# Patient Record
Sex: MALE | Race: WHITE | NOT HISPANIC OR LATINO | Employment: FULL TIME | URBAN - METROPOLITAN AREA
[De-identification: names, ages, dates, MRNs, and addresses within clinical notes are randomized per-mention and may not be internally consistent; named-entity substitution may affect disease eponyms.]

---

## 2021-02-20 ENCOUNTER — ANESTHESIA EVENT (OUTPATIENT)
Dept: PERIOP | Facility: HOSPITAL | Age: 66
End: 2021-02-20

## 2021-02-20 ENCOUNTER — ANESTHESIA (OUTPATIENT)
Dept: PERIOP | Facility: HOSPITAL | Age: 66
End: 2021-02-20

## 2021-02-20 ENCOUNTER — APPOINTMENT (OUTPATIENT)
Dept: GENERAL RADIOLOGY | Facility: HOSPITAL | Age: 66
End: 2021-02-20

## 2021-02-20 ENCOUNTER — HOSPITAL ENCOUNTER (OUTPATIENT)
Facility: HOSPITAL | Age: 66
Discharge: HOME | End: 2021-02-21
Attending: EMERGENCY MEDICINE | Admitting: ORTHOPAEDIC SURGERY

## 2021-02-20 DIAGNOSIS — S82.891B OPEN FRACTURE DISLOCATION OF RIGHT ANKLE: Primary | ICD-10-CM

## 2021-02-20 PROBLEM — I50.22 CHRONIC SYSTOLIC CONGESTIVE HEART FAILURE (HCC): Status: ACTIVE | Noted: 2021-02-20

## 2021-02-20 PROBLEM — I42.9 CARDIOMYOPATHY (HCC): Status: ACTIVE | Noted: 2021-02-20

## 2021-02-20 PROBLEM — I48.0 PAROXYSMAL A-FIB (HCC): Status: ACTIVE | Noted: 2021-02-20

## 2021-02-20 PROBLEM — E11.9 DM (DIABETES MELLITUS), TYPE 2 (HCC): Status: ACTIVE | Noted: 2021-02-20

## 2021-02-20 PROBLEM — N17.9 AKI (ACUTE KIDNEY INJURY) (HCC): Status: ACTIVE | Noted: 2021-02-20

## 2021-02-20 PROBLEM — I10 HTN (HYPERTENSION): Status: ACTIVE | Noted: 2021-02-20

## 2021-02-20 LAB
ABO GROUP BLD: NORMAL
ALBUMIN SERPL-MCNC: 4.5 G/DL (ref 3.5–5.2)
ALBUMIN/GLOB SERPL: 2 G/DL
ALP SERPL-CCNC: 40 U/L (ref 39–117)
ALT SERPL W P-5'-P-CCNC: 19 U/L (ref 1–41)
ANION GAP SERPL CALCULATED.3IONS-SCNC: 10.7 MMOL/L (ref 5–15)
APTT PPP: 38.7 SECONDS (ref 22.7–35.4)
AST SERPL-CCNC: 20 U/L (ref 1–40)
BASOPHILS # BLD AUTO: 0.07 10*3/MM3 (ref 0–0.2)
BASOPHILS NFR BLD AUTO: 0.9 % (ref 0–1.5)
BILIRUB SERPL-MCNC: 0.6 MG/DL (ref 0–1.2)
BLD GP AB SCN SERPL QL: NEGATIVE
BUN SERPL-MCNC: 18 MG/DL (ref 8–23)
BUN/CREAT SERPL: 13.6 (ref 7–25)
CALCIUM SPEC-SCNC: 9.5 MG/DL (ref 8.6–10.5)
CHLORIDE SERPL-SCNC: 107 MMOL/L (ref 98–107)
CO2 SERPL-SCNC: 21.3 MMOL/L (ref 22–29)
CREAT SERPL-MCNC: 1.32 MG/DL (ref 0.76–1.27)
DEPRECATED RDW RBC AUTO: 38.9 FL (ref 37–54)
EOSINOPHIL # BLD AUTO: 0.6 10*3/MM3 (ref 0–0.4)
EOSINOPHIL NFR BLD AUTO: 7.7 % (ref 0.3–6.2)
ERYTHROCYTE [DISTWIDTH] IN BLOOD BY AUTOMATED COUNT: 12.4 % (ref 12.3–15.4)
GFR SERPL CREATININE-BSD FRML MDRD: 54 ML/MIN/1.73
GLOBULIN UR ELPH-MCNC: 2.3 GM/DL
GLUCOSE BLDC GLUCOMTR-MCNC: 91 MG/DL (ref 70–130)
GLUCOSE BLDC GLUCOMTR-MCNC: 99 MG/DL (ref 70–130)
GLUCOSE SERPL-MCNC: 111 MG/DL (ref 65–99)
HBA1C MFR BLD: 6.25 % (ref 4.8–5.6)
HCT VFR BLD AUTO: 40.5 % (ref 37.5–51)
HGB BLD-MCNC: 13.5 G/DL (ref 13–17.7)
IMM GRANULOCYTES # BLD AUTO: 0.02 10*3/MM3 (ref 0–0.05)
IMM GRANULOCYTES NFR BLD AUTO: 0.3 % (ref 0–0.5)
INR PPP: 2.68 (ref 0.9–1.1)
LYMPHOCYTES # BLD AUTO: 1.36 10*3/MM3 (ref 0.7–3.1)
LYMPHOCYTES NFR BLD AUTO: 17.5 % (ref 19.6–45.3)
MCH RBC QN AUTO: 28.8 PG (ref 26.6–33)
MCHC RBC AUTO-ENTMCNC: 33.3 G/DL (ref 31.5–35.7)
MCV RBC AUTO: 86.5 FL (ref 79–97)
MONOCYTES # BLD AUTO: 0.56 10*3/MM3 (ref 0.1–0.9)
MONOCYTES NFR BLD AUTO: 7.2 % (ref 5–12)
NEUTROPHILS NFR BLD AUTO: 5.17 10*3/MM3 (ref 1.7–7)
NEUTROPHILS NFR BLD AUTO: 66.4 % (ref 42.7–76)
NRBC BLD AUTO-RTO: 0 /100 WBC (ref 0–0.2)
PLATELET # BLD AUTO: 186 10*3/MM3 (ref 140–450)
PMV BLD AUTO: 12.3 FL (ref 6–12)
POTASSIUM SERPL-SCNC: 4.7 MMOL/L (ref 3.5–5.2)
PROT SERPL-MCNC: 6.8 G/DL (ref 6–8.5)
PROTHROMBIN TIME: 28.3 SECONDS (ref 11.7–14.2)
QT INTERVAL: 472 MS
RBC # BLD AUTO: 4.68 10*6/MM3 (ref 4.14–5.8)
RH BLD: POSITIVE
SARS-COV-2 RNA RESP QL NAA+PROBE: NOT DETECTED
SODIUM SERPL-SCNC: 139 MMOL/L (ref 136–145)
T&S EXPIRATION DATE: NORMAL
WBC # BLD AUTO: 7.78 10*3/MM3 (ref 3.4–10.8)

## 2021-02-20 PROCEDURE — 96375 TX/PRO/DX INJ NEW DRUG ADDON: CPT

## 2021-02-20 PROCEDURE — 96365 THER/PROPH/DIAG IV INF INIT: CPT

## 2021-02-20 PROCEDURE — 86901 BLOOD TYPING SEROLOGIC RH(D): CPT | Performed by: PHYSICIAN ASSISTANT

## 2021-02-20 PROCEDURE — G0378 HOSPITAL OBSERVATION PER HR: HCPCS

## 2021-02-20 PROCEDURE — 73590 X-RAY EXAM OF LOWER LEG: CPT

## 2021-02-20 PROCEDURE — 76000 FLUOROSCOPY <1 HR PHYS/QHP: CPT

## 2021-02-20 PROCEDURE — 63710000001 SACUBITRIL-VALSARTAN 24-26 MG TABLET: Performed by: NURSE PRACTITIONER

## 2021-02-20 PROCEDURE — 25010000002 ROPIVACAINE PER 1 MG: Performed by: ANESTHESIOLOGY

## 2021-02-20 PROCEDURE — A9270 NON-COVERED ITEM OR SERVICE: HCPCS | Performed by: ORTHOPAEDIC SURGERY

## 2021-02-20 PROCEDURE — 73600 X-RAY EXAM OF ANKLE: CPT

## 2021-02-20 PROCEDURE — 93010 ELECTROCARDIOGRAM REPORT: CPT | Performed by: INTERNAL MEDICINE

## 2021-02-20 PROCEDURE — 25010000002 MORPHINE PER 10 MG: Performed by: EMERGENCY MEDICINE

## 2021-02-20 PROCEDURE — 25010000002 HYDROMORPHONE PER 4 MG: Performed by: ANESTHESIOLOGY

## 2021-02-20 PROCEDURE — 73610 X-RAY EXAM OF ANKLE: CPT

## 2021-02-20 PROCEDURE — 86850 RBC ANTIBODY SCREEN: CPT | Performed by: PHYSICIAN ASSISTANT

## 2021-02-20 PROCEDURE — 25010000002 DIPHENHYDRAMINE PER 50 MG: Performed by: ANESTHESIOLOGY

## 2021-02-20 PROCEDURE — 99285 EMERGENCY DEPT VISIT HI MDM: CPT

## 2021-02-20 PROCEDURE — 80053 COMPREHEN METABOLIC PANEL: CPT | Performed by: PHYSICIAN ASSISTANT

## 2021-02-20 PROCEDURE — 85730 THROMBOPLASTIN TIME PARTIAL: CPT | Performed by: PHYSICIAN ASSISTANT

## 2021-02-20 PROCEDURE — 25010000002 ONDANSETRON PER 1 MG: Performed by: INTERNAL MEDICINE

## 2021-02-20 PROCEDURE — 63710000001 CARVEDILOL 25 MG TABLET: Performed by: NURSE PRACTITIONER

## 2021-02-20 PROCEDURE — 93005 ELECTROCARDIOGRAM TRACING: CPT | Performed by: PHYSICIAN ASSISTANT

## 2021-02-20 PROCEDURE — C1713 ANCHOR/SCREW BN/BN,TIS/BN: HCPCS | Performed by: ORTHOPAEDIC SURGERY

## 2021-02-20 PROCEDURE — 25010000003 PHYTONADIONE 10 MG/ML SOLUTION 1 ML AMPULE: Performed by: PHYSICIAN ASSISTANT

## 2021-02-20 PROCEDURE — C9803 HOPD COVID-19 SPEC COLLECT: HCPCS

## 2021-02-20 PROCEDURE — 86900 BLOOD TYPING SEROLOGIC ABO: CPT | Performed by: PHYSICIAN ASSISTANT

## 2021-02-20 PROCEDURE — 63710000001 POVIDONE-IODINE 10 % SOLUTION 15 ML BOTTLE: Performed by: ORTHOPAEDIC SURGERY

## 2021-02-20 PROCEDURE — 25010000002 ONDANSETRON PER 1 MG: Performed by: ORTHOPAEDIC SURGERY

## 2021-02-20 PROCEDURE — 25010000002 NEOSTIGMINE PER 0.5 MG: Performed by: ANESTHESIOLOGY

## 2021-02-20 PROCEDURE — 82962 GLUCOSE BLOOD TEST: CPT

## 2021-02-20 PROCEDURE — 25010000002 FENTANYL CITRATE (PF) 100 MCG/2ML SOLUTION: Performed by: ANESTHESIOLOGY

## 2021-02-20 PROCEDURE — 71045 X-RAY EXAM CHEST 1 VIEW: CPT

## 2021-02-20 PROCEDURE — 83036 HEMOGLOBIN GLYCOSYLATED A1C: CPT | Performed by: NURSE PRACTITIONER

## 2021-02-20 PROCEDURE — 85025 COMPLETE CBC W/AUTO DIFF WBC: CPT | Performed by: PHYSICIAN ASSISTANT

## 2021-02-20 PROCEDURE — 25010000002 HYDROMORPHONE 1 MG/ML SOLUTION: Performed by: EMERGENCY MEDICINE

## 2021-02-20 PROCEDURE — U0003 INFECTIOUS AGENT DETECTION BY NUCLEIC ACID (DNA OR RNA); SEVERE ACUTE RESPIRATORY SYNDROME CORONAVIRUS 2 (SARS-COV-2) (CORONAVIRUS DISEASE [COVID-19]), AMPLIFIED PROBE TECHNIQUE, MAKING USE OF HIGH THROUGHPUT TECHNOLOGIES AS DESCRIBED BY CMS-2020-01-R: HCPCS | Performed by: PHYSICIAN ASSISTANT

## 2021-02-20 PROCEDURE — 25010000002 ONDANSETRON PER 1 MG: Performed by: EMERGENCY MEDICINE

## 2021-02-20 PROCEDURE — 63710000001 OXYCODONE-ACETAMINOPHEN 7.5-325 MG TABLET: Performed by: ORTHOPAEDIC SURGERY

## 2021-02-20 PROCEDURE — A9270 NON-COVERED ITEM OR SERVICE: HCPCS | Performed by: NURSE PRACTITIONER

## 2021-02-20 PROCEDURE — 25010000003 CEFAZOLIN IN DEXTROSE 2-4 GM/100ML-% SOLUTION: Performed by: PHYSICIAN ASSISTANT

## 2021-02-20 PROCEDURE — 90715 TDAP VACCINE 7 YRS/> IM: CPT | Performed by: PHYSICIAN ASSISTANT

## 2021-02-20 PROCEDURE — 25010000003 MEPIVACAINE PER 10 ML: Performed by: ANESTHESIOLOGY

## 2021-02-20 PROCEDURE — 25010000002 PROPOFOL 10 MG/ML EMULSION: Performed by: ANESTHESIOLOGY

## 2021-02-20 PROCEDURE — 99152 MOD SED SAME PHYS/QHP 5/>YRS: CPT

## 2021-02-20 PROCEDURE — 25010000002 PROPOFOL 10 MG/ML EMULSION: Performed by: EMERGENCY MEDICINE

## 2021-02-20 PROCEDURE — 63710000001 DOCUSATE SODIUM 100 MG CAPSULE: Performed by: ORTHOPAEDIC SURGERY

## 2021-02-20 PROCEDURE — 90471 IMMUNIZATION ADMIN: CPT | Performed by: PHYSICIAN ASSISTANT

## 2021-02-20 PROCEDURE — 76942 ECHO GUIDE FOR BIOPSY: CPT | Performed by: ORTHOPAEDIC SURGERY

## 2021-02-20 PROCEDURE — 25010000002 TDAP 5-2.5-18.5 LF-MCG/0.5 SUSPENSION: Performed by: PHYSICIAN ASSISTANT

## 2021-02-20 PROCEDURE — 25010000002 CEFAZOLIN 1-4 GM/50ML-% SOLUTION: Performed by: ANESTHESIOLOGY

## 2021-02-20 PROCEDURE — 85610 PROTHROMBIN TIME: CPT | Performed by: PHYSICIAN ASSISTANT

## 2021-02-20 DEVICE — JET-X FREEDOM CLAMP 10.5MM TO 10.5MM
Type: IMPLANTABLE DEVICE | Site: LEG | Status: FUNCTIONAL
Brand: JET-X

## 2021-02-20 DEVICE — JET-X 4 HOLE PIN CLAMP
Type: IMPLANTABLE DEVICE | Site: LEG | Status: FUNCTIONAL
Brand: JET-X

## 2021-02-20 DEVICE — JET-X QUICK CLAMP 10.5MM TO 5MM
Type: IMPLANTABLE DEVICE | Site: LEG | Status: FUNCTIONAL
Brand: JET-X

## 2021-02-20 DEVICE — TIN 5MM X 35MM SHORT HALF PIN
Type: IMPLANTABLE DEVICE | Site: LEG | Status: FUNCTIONAL
Brand: JET-X

## 2021-02-20 DEVICE — JET-X V-BAR
Type: IMPLANTABLE DEVICE | Site: LEG | Status: FUNCTIONAL
Brand: JET-X

## 2021-02-20 DEVICE — SUT/ANCH JUGGERKNOT SFT RIGID SHT SZ1 1.4MM W/BIT: Type: IMPLANTABLE DEVICE | Site: ANKLE | Status: FUNCTIONAL

## 2021-02-20 DEVICE — TIN 5 X 50 TRACTION PIN
Type: IMPLANTABLE DEVICE | Site: LEG | Status: FUNCTIONAL
Brand: JET-X

## 2021-02-20 DEVICE — JET-X 30 DEGREE ANGLED POST
Type: IMPLANTABLE DEVICE | Site: LEG | Status: FUNCTIONAL
Brand: JET-X

## 2021-02-20 RX ORDER — SODIUM CHLORIDE 0.9 % (FLUSH) 0.9 %
10 SYRINGE (ML) INJECTION AS NEEDED
Status: DISCONTINUED | OUTPATIENT
Start: 2021-02-20 | End: 2021-02-21 | Stop reason: HOSPADM

## 2021-02-20 RX ORDER — LEVOTHYROXINE SODIUM 0.07 MG/1
75 TABLET ORAL
Status: DISCONTINUED | OUTPATIENT
Start: 2021-02-21 | End: 2021-02-21 | Stop reason: HOSPADM

## 2021-02-20 RX ORDER — ROCURONIUM BROMIDE 10 MG/ML
INJECTION, SOLUTION INTRAVENOUS AS NEEDED
Status: DISCONTINUED | OUTPATIENT
Start: 2021-02-20 | End: 2021-02-20 | Stop reason: SURG

## 2021-02-20 RX ORDER — CEFAZOLIN SODIUM 2 G/100ML
2 INJECTION, SOLUTION INTRAVENOUS ONCE
Status: COMPLETED | OUTPATIENT
Start: 2021-02-20 | End: 2021-02-20

## 2021-02-20 RX ORDER — NALOXONE HCL 0.4 MG/ML
0.2 VIAL (ML) INJECTION AS NEEDED
Status: DISCONTINUED | OUTPATIENT
Start: 2021-02-20 | End: 2021-02-20 | Stop reason: HOSPADM

## 2021-02-20 RX ORDER — DIPHENHYDRAMINE HCL 25 MG
25 CAPSULE ORAL
Status: DISCONTINUED | OUTPATIENT
Start: 2021-02-20 | End: 2021-02-20 | Stop reason: HOSPADM

## 2021-02-20 RX ORDER — HYDROMORPHONE HCL 110MG/55ML
PATIENT CONTROLLED ANALGESIA SYRINGE INTRAVENOUS AS NEEDED
Status: DISCONTINUED | OUTPATIENT
Start: 2021-02-20 | End: 2021-02-20 | Stop reason: SURG

## 2021-02-20 RX ORDER — FENTANYL CITRATE 50 UG/ML
50 INJECTION, SOLUTION INTRAMUSCULAR; INTRAVENOUS
Status: DISCONTINUED | OUTPATIENT
Start: 2021-02-20 | End: 2021-02-20 | Stop reason: HOSPADM

## 2021-02-20 RX ORDER — SODIUM CHLORIDE, SODIUM LACTATE, POTASSIUM CHLORIDE, CALCIUM CHLORIDE 600; 310; 30; 20 MG/100ML; MG/100ML; MG/100ML; MG/100ML
9 INJECTION, SOLUTION INTRAVENOUS CONTINUOUS PRN
Status: DISCONTINUED | OUTPATIENT
Start: 2021-02-20 | End: 2021-02-21 | Stop reason: HOSPADM

## 2021-02-20 RX ORDER — MIDAZOLAM HYDROCHLORIDE 1 MG/ML
1 INJECTION INTRAMUSCULAR; INTRAVENOUS
Status: DISCONTINUED | OUTPATIENT
Start: 2021-02-20 | End: 2021-02-20 | Stop reason: HOSPADM

## 2021-02-20 RX ORDER — OXYCODONE AND ACETAMINOPHEN 7.5; 325 MG/1; MG/1
1 TABLET ORAL ONCE AS NEEDED
Status: DISCONTINUED | OUTPATIENT
Start: 2021-02-20 | End: 2021-02-20 | Stop reason: HOSPADM

## 2021-02-20 RX ORDER — CARVEDILOL 25 MG/1
25 TABLET ORAL 2 TIMES DAILY WITH MEALS
COMMUNITY

## 2021-02-20 RX ORDER — ACETAMINOPHEN 650 MG/1
650 SUPPOSITORY RECTAL EVERY 4 HOURS PRN
Status: DISCONTINUED | OUTPATIENT
Start: 2021-02-20 | End: 2021-02-21 | Stop reason: HOSPADM

## 2021-02-20 RX ORDER — PROMETHAZINE HYDROCHLORIDE 25 MG/1
25 SUPPOSITORY RECTAL ONCE AS NEEDED
Status: DISCONTINUED | OUTPATIENT
Start: 2021-02-20 | End: 2021-02-20 | Stop reason: HOSPADM

## 2021-02-20 RX ORDER — ACETAMINOPHEN 160 MG/5ML
650 SOLUTION ORAL EVERY 4 HOURS PRN
Status: DISCONTINUED | OUTPATIENT
Start: 2021-02-20 | End: 2021-02-21 | Stop reason: HOSPADM

## 2021-02-20 RX ORDER — ASPIRIN 81 MG/1
81 TABLET ORAL DAILY
Status: DISCONTINUED | OUTPATIENT
Start: 2021-02-21 | End: 2021-02-21 | Stop reason: HOSPADM

## 2021-02-20 RX ORDER — FENTANYL CITRATE 50 UG/ML
INJECTION, SOLUTION INTRAMUSCULAR; INTRAVENOUS AS NEEDED
Status: DISCONTINUED | OUTPATIENT
Start: 2021-02-20 | End: 2021-02-20 | Stop reason: SURG

## 2021-02-20 RX ORDER — ONDANSETRON 2 MG/ML
4 INJECTION INTRAMUSCULAR; INTRAVENOUS ONCE
Status: COMPLETED | OUTPATIENT
Start: 2021-02-20 | End: 2021-02-20

## 2021-02-20 RX ORDER — LIDOCAINE HYDROCHLORIDE 20 MG/ML
INJECTION, SOLUTION INFILTRATION; PERINEURAL AS NEEDED
Status: DISCONTINUED | OUTPATIENT
Start: 2021-02-20 | End: 2021-02-20 | Stop reason: SURG

## 2021-02-20 RX ORDER — ACETAMINOPHEN 325 MG/1
650 TABLET ORAL EVERY 4 HOURS PRN
Status: DISCONTINUED | OUTPATIENT
Start: 2021-02-20 | End: 2021-02-21 | Stop reason: HOSPADM

## 2021-02-20 RX ORDER — MORPHINE SULFATE 2 MG/ML
2 INJECTION, SOLUTION INTRAMUSCULAR; INTRAVENOUS
Status: DISCONTINUED | OUTPATIENT
Start: 2021-02-20 | End: 2021-02-20 | Stop reason: SDUPTHER

## 2021-02-20 RX ORDER — LEVOTHYROXINE SODIUM 0.07 MG/1
75 TABLET ORAL DAILY
COMMUNITY

## 2021-02-20 RX ORDER — ONDANSETRON 2 MG/ML
4 INJECTION INTRAMUSCULAR; INTRAVENOUS ONCE AS NEEDED
Status: DISCONTINUED | OUTPATIENT
Start: 2021-02-20 | End: 2021-02-20 | Stop reason: HOSPADM

## 2021-02-20 RX ORDER — HYDROCODONE BITARTRATE AND ACETAMINOPHEN 5; 325 MG/1; MG/1
1 TABLET ORAL EVERY 4 HOURS PRN
Status: DISCONTINUED | OUTPATIENT
Start: 2021-02-20 | End: 2021-02-21 | Stop reason: HOSPADM

## 2021-02-20 RX ORDER — WARFARIN SODIUM 7.5 MG/1
7.5 TABLET ORAL
COMMUNITY
End: 2021-02-21 | Stop reason: HOSPADM

## 2021-02-20 RX ORDER — CARVEDILOL 25 MG/1
25 TABLET ORAL 2 TIMES DAILY WITH MEALS
Status: DISCONTINUED | OUTPATIENT
Start: 2021-02-20 | End: 2021-02-21 | Stop reason: HOSPADM

## 2021-02-20 RX ORDER — ALBUTEROL SULFATE 90 UG/1
2 AEROSOL, METERED RESPIRATORY (INHALATION) EVERY 4 HOURS PRN
COMMUNITY

## 2021-02-20 RX ORDER — EPHEDRINE SULFATE 50 MG/ML
5 INJECTION, SOLUTION INTRAVENOUS ONCE AS NEEDED
Status: DISCONTINUED | OUTPATIENT
Start: 2021-02-20 | End: 2021-02-20 | Stop reason: HOSPADM

## 2021-02-20 RX ORDER — CEFAZOLIN SODIUM 2 G/100ML
2 INJECTION, SOLUTION INTRAVENOUS EVERY 8 HOURS
Status: DISCONTINUED | OUTPATIENT
Start: 2021-02-21 | End: 2021-02-21 | Stop reason: HOSPADM

## 2021-02-20 RX ORDER — ONDANSETRON 2 MG/ML
4 INJECTION INTRAMUSCULAR; INTRAVENOUS EVERY 6 HOURS PRN
Status: DISCONTINUED | OUTPATIENT
Start: 2021-02-20 | End: 2021-02-21 | Stop reason: HOSPADM

## 2021-02-20 RX ORDER — AMIODARONE HYDROCHLORIDE 200 MG/1
100 TABLET ORAL DAILY
Status: DISCONTINUED | OUTPATIENT
Start: 2021-02-21 | End: 2021-02-21 | Stop reason: HOSPADM

## 2021-02-20 RX ORDER — DIPHENHYDRAMINE HYDROCHLORIDE 50 MG/ML
12.5 INJECTION INTRAMUSCULAR; INTRAVENOUS
Status: DISCONTINUED | OUTPATIENT
Start: 2021-02-20 | End: 2021-02-20 | Stop reason: HOSPADM

## 2021-02-20 RX ORDER — EPHEDRINE SULFATE 50 MG/ML
INJECTION, SOLUTION INTRAVENOUS AS NEEDED
Status: DISCONTINUED | OUTPATIENT
Start: 2021-02-20 | End: 2021-02-20 | Stop reason: SURG

## 2021-02-20 RX ORDER — NALOXONE HCL 0.4 MG/ML
0.4 VIAL (ML) INJECTION
Status: DISCONTINUED | OUTPATIENT
Start: 2021-02-20 | End: 2021-02-21 | Stop reason: HOSPADM

## 2021-02-20 RX ORDER — PROPOFOL 10 MG/ML
VIAL (ML) INTRAVENOUS AS NEEDED
Status: DISCONTINUED | OUTPATIENT
Start: 2021-02-20 | End: 2021-02-20 | Stop reason: SURG

## 2021-02-20 RX ORDER — SODIUM CHLORIDE 0.9 % (FLUSH) 0.9 %
10 SYRINGE (ML) INJECTION EVERY 12 HOURS SCHEDULED
Status: DISCONTINUED | OUTPATIENT
Start: 2021-02-20 | End: 2021-02-21 | Stop reason: HOSPADM

## 2021-02-20 RX ORDER — SODIUM CHLORIDE 0.9 % (FLUSH) 0.9 %
10 SYRINGE (ML) INJECTION EVERY 12 HOURS SCHEDULED
Status: DISCONTINUED | OUTPATIENT
Start: 2021-02-20 | End: 2021-02-20 | Stop reason: HOSPADM

## 2021-02-20 RX ORDER — MORPHINE SULFATE 2 MG/ML
4 INJECTION, SOLUTION INTRAMUSCULAR; INTRAVENOUS ONCE
Status: COMPLETED | OUTPATIENT
Start: 2021-02-20 | End: 2021-02-20

## 2021-02-20 RX ORDER — OXYCODONE AND ACETAMINOPHEN 7.5; 325 MG/1; MG/1
2 TABLET ORAL EVERY 4 HOURS PRN
Status: DISCONTINUED | OUTPATIENT
Start: 2021-02-20 | End: 2021-02-21 | Stop reason: HOSPADM

## 2021-02-20 RX ORDER — MORPHINE SULFATE 2 MG/ML
2 INJECTION, SOLUTION INTRAMUSCULAR; INTRAVENOUS EVERY 4 HOURS PRN
Status: DISCONTINUED | OUTPATIENT
Start: 2021-02-20 | End: 2021-02-21 | Stop reason: HOSPADM

## 2021-02-20 RX ORDER — PROPOFOL 10 MG/ML
VIAL (ML) INTRAVENOUS
Status: COMPLETED
Start: 2021-02-20 | End: 2021-02-20

## 2021-02-20 RX ORDER — DEXTROSE MONOHYDRATE 25 G/50ML
25 INJECTION, SOLUTION INTRAVENOUS
Status: DISCONTINUED | OUTPATIENT
Start: 2021-02-20 | End: 2021-02-21 | Stop reason: HOSPADM

## 2021-02-20 RX ORDER — HYDROCODONE BITARTRATE AND ACETAMINOPHEN 7.5; 325 MG/1; MG/1
1 TABLET ORAL ONCE AS NEEDED
Status: DISCONTINUED | OUTPATIENT
Start: 2021-02-20 | End: 2021-02-20 | Stop reason: HOSPADM

## 2021-02-20 RX ORDER — SODIUM CHLORIDE 0.9 % (FLUSH) 0.9 %
10 SYRINGE (ML) INJECTION AS NEEDED
Status: DISCONTINUED | OUTPATIENT
Start: 2021-02-20 | End: 2021-02-20 | Stop reason: HOSPADM

## 2021-02-20 RX ORDER — PROPOFOL 10 MG/ML
VIAL (ML) INTRAVENOUS
Status: COMPLETED | OUTPATIENT
Start: 2021-02-20 | End: 2021-02-20

## 2021-02-20 RX ORDER — MIDAZOLAM HYDROCHLORIDE 1 MG/ML
0.5 INJECTION INTRAMUSCULAR; INTRAVENOUS
Status: DISCONTINUED | OUTPATIENT
Start: 2021-02-20 | End: 2021-02-20 | Stop reason: HOSPADM

## 2021-02-20 RX ORDER — DOCUSATE SODIUM 100 MG/1
100 CAPSULE, LIQUID FILLED ORAL 2 TIMES DAILY
Status: DISCONTINUED | OUTPATIENT
Start: 2021-02-20 | End: 2021-02-21 | Stop reason: HOSPADM

## 2021-02-20 RX ORDER — ASPIRIN 81 MG/1
81 TABLET ORAL DAILY
COMMUNITY

## 2021-02-20 RX ORDER — AMIODARONE HYDROCHLORIDE 200 MG/1
100 TABLET ORAL DAILY
COMMUNITY

## 2021-02-20 RX ORDER — INSULIN LISPRO 100 [IU]/ML
0-9 INJECTION, SOLUTION INTRAVENOUS; SUBCUTANEOUS
Status: DISCONTINUED | OUTPATIENT
Start: 2021-02-21 | End: 2021-02-21 | Stop reason: HOSPADM

## 2021-02-20 RX ORDER — OXYCODONE AND ACETAMINOPHEN 7.5; 325 MG/1; MG/1
1 TABLET ORAL EVERY 4 HOURS PRN
Status: DISCONTINUED | OUTPATIENT
Start: 2021-02-20 | End: 2021-02-21 | Stop reason: HOSPADM

## 2021-02-20 RX ORDER — SPIRONOLACTONE 25 MG/1
25 TABLET ORAL DAILY
COMMUNITY

## 2021-02-20 RX ORDER — HYDROMORPHONE HYDROCHLORIDE 1 MG/ML
0.5 INJECTION, SOLUTION INTRAMUSCULAR; INTRAVENOUS; SUBCUTANEOUS
Status: DISCONTINUED | OUTPATIENT
Start: 2021-02-20 | End: 2021-02-20 | Stop reason: HOSPADM

## 2021-02-20 RX ORDER — PROMETHAZINE HYDROCHLORIDE 25 MG/1
25 TABLET ORAL ONCE AS NEEDED
Status: DISCONTINUED | OUTPATIENT
Start: 2021-02-20 | End: 2021-02-20 | Stop reason: HOSPADM

## 2021-02-20 RX ORDER — SACUBITRIL AND VALSARTAN 24; 26 MG/1; MG/1
1 TABLET, FILM COATED ORAL 2 TIMES DAILY
COMMUNITY

## 2021-02-20 RX ORDER — CEFAZOLIN SODIUM 1 G/3ML
1 INJECTION, POWDER, FOR SOLUTION INTRAMUSCULAR; INTRAVENOUS ONCE
Status: DISCONTINUED | OUTPATIENT
Start: 2021-02-20 | End: 2021-02-20 | Stop reason: HOSPADM

## 2021-02-20 RX ORDER — NICOTINE POLACRILEX 4 MG
15 LOZENGE BUCCAL
Status: DISCONTINUED | OUTPATIENT
Start: 2021-02-20 | End: 2021-02-21 | Stop reason: HOSPADM

## 2021-02-20 RX ORDER — MAGNESIUM HYDROXIDE 1200 MG/15ML
LIQUID ORAL AS NEEDED
Status: DISCONTINUED | OUTPATIENT
Start: 2021-02-20 | End: 2021-02-20 | Stop reason: HOSPADM

## 2021-02-20 RX ORDER — CEFAZOLIN SODIUM 1 G/50ML
INJECTION, SOLUTION INTRAVENOUS AS NEEDED
Status: DISCONTINUED | OUTPATIENT
Start: 2021-02-20 | End: 2021-02-20 | Stop reason: SURG

## 2021-02-20 RX ORDER — ROPIVACAINE HYDROCHLORIDE 5 MG/ML
INJECTION, SOLUTION EPIDURAL; INFILTRATION; PERINEURAL
Status: DISCONTINUED | OUTPATIENT
Start: 2021-02-20 | End: 2021-02-20 | Stop reason: SURG

## 2021-02-20 RX ORDER — FLUMAZENIL 0.1 MG/ML
0.2 INJECTION INTRAVENOUS AS NEEDED
Status: DISCONTINUED | OUTPATIENT
Start: 2021-02-20 | End: 2021-02-20 | Stop reason: HOSPADM

## 2021-02-20 RX ORDER — GLYCOPYRROLATE 0.2 MG/ML
INJECTION INTRAMUSCULAR; INTRAVENOUS AS NEEDED
Status: DISCONTINUED | OUTPATIENT
Start: 2021-02-20 | End: 2021-02-20 | Stop reason: SURG

## 2021-02-20 RX ORDER — FAMOTIDINE 10 MG/ML
20 INJECTION, SOLUTION INTRAVENOUS
Status: COMPLETED | OUTPATIENT
Start: 2021-02-20 | End: 2021-02-20

## 2021-02-20 RX ORDER — LABETALOL HYDROCHLORIDE 5 MG/ML
5 INJECTION, SOLUTION INTRAVENOUS
Status: DISCONTINUED | OUTPATIENT
Start: 2021-02-20 | End: 2021-02-20 | Stop reason: HOSPADM

## 2021-02-20 RX ADMIN — HYDROMORPHONE HYDROCHLORIDE 1 MG: 1 INJECTION, SOLUTION INTRAMUSCULAR; INTRAVENOUS; SUBCUTANEOUS at 12:18

## 2021-02-20 RX ADMIN — HYDROMORPHONE HYDROCHLORIDE 0.5 MG: 1 INJECTION, SOLUTION INTRAMUSCULAR; INTRAVENOUS; SUBCUTANEOUS at 19:14

## 2021-02-20 RX ADMIN — EPHEDRINE SULFATE 10 MG: 50 INJECTION INTRAVENOUS at 18:23

## 2021-02-20 RX ADMIN — HYDROMORPHONE HYDROCHLORIDE 0.5 MG: 2 INJECTION, SOLUTION INTRAMUSCULAR; INTRAVENOUS; SUBCUTANEOUS at 18:23

## 2021-02-20 RX ADMIN — EPHEDRINE SULFATE 10 MG: 50 INJECTION INTRAVENOUS at 17:56

## 2021-02-20 RX ADMIN — PROPOFOL 200 MG: 10 INJECTION, EMULSION INTRAVENOUS at 16:29

## 2021-02-20 RX ADMIN — CEFAZOLIN SODIUM 1 G: 1 INJECTION, SOLUTION INTRAVENOUS at 16:35

## 2021-02-20 RX ADMIN — TETANUS TOXOID, REDUCED DIPHTHERIA TOXOID AND ACELLULAR PERTUSSIS VACCINE, ADSORBED 0.5 ML: 5; 2.5; 8; 8; 2.5 SUSPENSION INTRAMUSCULAR at 11:23

## 2021-02-20 RX ADMIN — CARVEDILOL 25 MG: 25 TABLET, FILM COATED ORAL at 23:06

## 2021-02-20 RX ADMIN — MEPIVACAINE HYDROCHLORIDE 10 ML: 15 INJECTION, SOLUTION EPIDURAL; INFILTRATION at 19:49

## 2021-02-20 RX ADMIN — SACUBITRIL AND VALSARTAN 1 TABLET: 24; 26 TABLET, FILM COATED ORAL at 23:06

## 2021-02-20 RX ADMIN — DIPHENHYDRAMINE HYDROCHLORIDE 12.5 MG: 50 INJECTION, SOLUTION INTRAMUSCULAR; INTRAVENOUS at 19:15

## 2021-02-20 RX ADMIN — FENTANYL CITRATE 50 MCG: 50 INJECTION, SOLUTION INTRAMUSCULAR; INTRAVENOUS at 18:45

## 2021-02-20 RX ADMIN — HYDROMORPHONE HYDROCHLORIDE 0.5 MG: 1 INJECTION, SOLUTION INTRAMUSCULAR; INTRAVENOUS; SUBCUTANEOUS at 18:48

## 2021-02-20 RX ADMIN — FENTANYL CITRATE 50 MCG: 50 INJECTION, SOLUTION INTRAMUSCULAR; INTRAVENOUS at 18:50

## 2021-02-20 RX ADMIN — GLYCOPYRROLATE 1 MG: 0.2 INJECTION INTRAMUSCULAR; INTRAVENOUS at 18:16

## 2021-02-20 RX ADMIN — LIDOCAINE HYDROCHLORIDE 100 MG: 20 INJECTION, SOLUTION INFILTRATION; PERINEURAL at 16:29

## 2021-02-20 RX ADMIN — HYDROMORPHONE HYDROCHLORIDE 0.5 MG: 1 INJECTION, SOLUTION INTRAMUSCULAR; INTRAVENOUS; SUBCUTANEOUS at 19:05

## 2021-02-20 RX ADMIN — PHYTONADIONE 10 MG: 10 INJECTION, EMULSION INTRAMUSCULAR; INTRAVENOUS; SUBCUTANEOUS at 12:57

## 2021-02-20 RX ADMIN — PROPOFOL 80 MG: 10 INJECTION, EMULSION INTRAVENOUS at 13:27

## 2021-02-20 RX ADMIN — HYDROMORPHONE HYDROCHLORIDE 0.5 MG: 2 INJECTION, SOLUTION INTRAMUSCULAR; INTRAVENOUS; SUBCUTANEOUS at 18:28

## 2021-02-20 RX ADMIN — HYDROMORPHONE HYDROCHLORIDE 0.5 MG: 1 INJECTION, SOLUTION INTRAMUSCULAR; INTRAVENOUS; SUBCUTANEOUS at 18:53

## 2021-02-20 RX ADMIN — OXYCODONE HYDROCHLORIDE AND ACETAMINOPHEN 1 TABLET: 7.5; 325 TABLET ORAL at 23:06

## 2021-02-20 RX ADMIN — FAMOTIDINE 20 MG: 10 INJECTION INTRAVENOUS at 16:15

## 2021-02-20 RX ADMIN — EPHEDRINE SULFATE 20 MG: 50 INJECTION INTRAVENOUS at 16:51

## 2021-02-20 RX ADMIN — FENTANYL CITRATE 100 MCG: 50 INJECTION INTRAMUSCULAR; INTRAVENOUS at 16:29

## 2021-02-20 RX ADMIN — CEFAZOLIN SODIUM 2 G: 2 INJECTION, SOLUTION INTRAVENOUS at 11:22

## 2021-02-20 RX ADMIN — FENTANYL CITRATE 50 MCG: 50 INJECTION, SOLUTION INTRAMUSCULAR; INTRAVENOUS at 19:10

## 2021-02-20 RX ADMIN — FENTANYL CITRATE 50 MCG: 50 INJECTION, SOLUTION INTRAMUSCULAR; INTRAVENOUS at 19:03

## 2021-02-20 RX ADMIN — SODIUM CHLORIDE, PRESERVATIVE FREE 10 ML: 5 INJECTION INTRAVENOUS at 23:08

## 2021-02-20 RX ADMIN — ROCURONIUM BROMIDE 50 MG: 10 INJECTION INTRAVENOUS at 16:29

## 2021-02-20 RX ADMIN — EPHEDRINE SULFATE 10 MG: 50 INJECTION INTRAVENOUS at 17:26

## 2021-02-20 RX ADMIN — ROPIVACAINE HYDROCHLORIDE 30 ML: 5 INJECTION, SOLUTION EPIDURAL; INFILTRATION; PERINEURAL at 19:49

## 2021-02-20 RX ADMIN — NEOSTIGMINE METHYLSULFATE 5 MG: 1 INJECTION INTRAMUSCULAR; INTRAVENOUS; SUBCUTANEOUS at 18:16

## 2021-02-20 RX ADMIN — ONDANSETRON 4 MG: 2 INJECTION INTRAMUSCULAR; INTRAVENOUS at 10:38

## 2021-02-20 RX ADMIN — SODIUM CHLORIDE, POTASSIUM CHLORIDE, SODIUM LACTATE AND CALCIUM CHLORIDE 9 ML/HR: 600; 310; 30; 20 INJECTION, SOLUTION INTRAVENOUS at 16:16

## 2021-02-20 RX ADMIN — ONDANSETRON HYDROCHLORIDE 4 MG: 2 SOLUTION INTRAMUSCULAR; INTRAVENOUS at 18:16

## 2021-02-20 RX ADMIN — DOCUSATE SODIUM 100 MG: 100 CAPSULE, LIQUID FILLED ORAL at 23:06

## 2021-02-20 RX ADMIN — MORPHINE SULFATE 4 MG: 2 INJECTION, SOLUTION INTRAMUSCULAR; INTRAVENOUS at 10:38

## 2021-02-20 NOTE — H&P
[facility] Admission H&P    Patient Care Team:  Provider, No Known as PCP - General    Chief complaint: Slipped on ice, twisted right ankle    History of Present Illness    This is a 65-year-old male with history of chronic systolic heart failure and cardiomyopathy with ejection fraction of 25%, ICD placement in 2011, paroxysmal atrial fibrillation for which she is on Coumadin, hypertension, diabetes who presented to the emergency room after he sustained a slip on some ice this morning and experienced significant twisting and torque of his right ankle resulting in dislocation and fracture.  The skin surface was disrupted on the inner aspect of the ankle.  Orthopedic surgery has been consulted and will perform surgery this evening.  I been asked to admit him for further care given his other medical issues.  He states that his congestive heart failure has been very stable and well maintained over the past several years at least.  He denies any chest pain, shortness of breath, palpitations, orthopnea.  No new onset swelling recently or change in exercise tolerance.  His medication doses have been unchanged for quite some time.    Past Medical History:   Diagnosis Date   • Depression    • Hypertension      Past Surgical History:   Procedure Laterality Date   • PACEMAKER IMPLANTATION       History reviewed. No pertinent family history.  Social History     Tobacco Use   • Smoking status: Former Smoker   Substance Use Topics   • Alcohol use: Never     Frequency: Never   • Drug use: Never     Medications reviewed    Allergies:  Statins    Review of Systems   Constitutional: Negative for chills and fever.   HENT: Negative for congestion and sore throat.    Eyes: Negative for visual disturbance.   Respiratory: Negative for cough, chest tightness, shortness of breath and wheezing.    Cardiovascular: Negative for chest pain, palpitations and leg swelling.   Gastrointestinal: Negative for abdominal distention, abdominal pain, diarrhea,  nausea and vomiting.   Endocrine: Negative for polydipsia and polyuria.   Genitourinary: Negative for difficulty urinating, dysuria, frequency and urgency.   Musculoskeletal: Negative for arthralgias and myalgias.        Pain in the right ankle   Skin: Negative for color change and rash.   Neurological: Negative for dizziness and light-headedness.        PHYSICAL EXAM    Vital Signs  tMax 24 hrs:  Temp (24hrs), Av.8 °F (36 °C), Min:96.8 °F (36 °C), Max:96.8 °F (36 °C)    Vitals Ranges:  Temp:  [96.8 °F (36 °C)] 96.8 °F (36 °C)  Heart Rate:  [69-88] 69  Resp:  [14-16] 16  BP: (116-134)/(70-92) 134/92    Physical Exam  Vitals signs and nursing note reviewed.   Constitutional:       General: He is not in acute distress.     Appearance: He is well-developed. He is not ill-appearing.   HENT:      Head: Normocephalic and atraumatic.      Nose: Nose normal.      Mouth/Throat:      Mouth: Mucous membranes are not dry.   Eyes:      Conjunctiva/sclera: Conjunctivae normal.      Pupils: Pupils are equal, round, and reactive to light.   Neck:      Musculoskeletal: Normal range of motion and neck supple.      Vascular: No JVD.   Cardiovascular:      Rate and Rhythm: Normal rate and regular rhythm.      Heart sounds: No murmur. No gallop.    Pulmonary:      Effort: Pulmonary effort is normal. No accessory muscle usage or respiratory distress.      Breath sounds: No decreased breath sounds or wheezing.   Abdominal:      General: Bowel sounds are normal. There is no distension.      Palpations: Abdomen is soft.      Tenderness: There is no abdominal tenderness. There is no guarding or rebound.   Musculoskeletal: Normal range of motion.         General: No deformity.      Comments: Right ankle is obviously deformed with dislocation and fracture.   Lymphadenopathy:      Cervical: No cervical adenopathy.   Skin:     General: Skin is warm and dry.      Findings: No erythema or rash.      Comments: Small puncture through the medial  aspect of the right ankle with trace amount of bleeding   Neurological:      Mental Status: He is alert and oriented to person, place, and time.      Cranial Nerves: No cranial nerve deficit.         Results Review:  Results from last 7 days   Lab Units 02/20/21  1033   WBC 10*3/mm3 7.78   HEMOGLOBIN g/dL 13.5   HEMATOCRIT % 40.5   PLATELETS 10*3/mm3 186     Results from last 7 days   Lab Units 02/20/21  1033   SODIUM mmol/L 139   POTASSIUM mmol/L 4.7   CHLORIDE mmol/L 107   CO2 mmol/L 21.3*   BUN mg/dL 18   CREATININE mg/dL 1.32*   CALCIUM mg/dL 9.5   BILIRUBIN mg/dL 0.6   ALK PHOS U/L 40   ALT (SGPT) U/L 19   AST (SGOT) U/L 20   GLUCOSE mg/dL 111*     X-ray of the right tib-fib and ankle:  There is a severe fracture dislocation with fracture of the   distal fibula near the lateral malleolus combined with widening of the   ankle mortise medially and anterior dislocation of the tibia relative to   the talus as seen in the lateral view.      I reviewed the patient's new clinical results.  I reviewed the patient's new imaging results and agree with the interpretation.        Active Hospital Problems    Diagnosis  POA   • **Open fracture dislocation of right ankle [S82.891B]  Unknown   • HTN (hypertension) [I10]  Unknown   • Paroxysmal A-fib (CMS/HCC) [I48.0]  Unknown   • Chronic systolic congestive heart failure (CMS/HCC) [I50.22]  Unknown   • Cardiomyopathy (CMS/HCC) [I42.9]  Unknown   • MARSHALL vs CKD 3a [N17.9]  Unknown   • DM (diabetes mellitus), type 2 (CMS/HCC) [E11.9]  Unknown      Resolved Hospital Problems   No resolved problems to display.       Assessment & Plan    The patient will be admitted.  Orthopedic surgery has been consulted and indicated they can perform surgery this evening.  From a preoperative standpoint with regards to his chronic medical conditions, he is euvolemic with no evidence of decompensated heart failure.  Heart rate is well controlled.  He is going to receive vitamin K for reversal of his  INR which I am okay with.  Defer any need for FFP to orthopedic surgery.  He is of acceptable risk to undergo surgery later today as long as his EKG does not show any acute or unexpected changes.  He denies chest pain, shortness of breath or change in exercise tolerance.  I will also check a baseline chest x-ray but his lungs sound clear and he is not on oxygen.  Will provide supportive care with pain control.  He has been given preoperative antibiotics and the emergency room provider is going to splint his leg until he can have surgery later.  Blood sugars look reasonably controlled and I think this can be controlled with diet.  No need for Accu-Cheks or sliding scale at this time.  I am not sure if his creatinine of 1.3 and slight reduction of GFR is at his baseline.  I do suspect he probably has some underlying chronic kidney disease at this point.  Last labs I can see are from 2011 as he is from [place] and receives all of his care up there..  I will review his medications once reconciled and we will follow his renal function closely along with volume status.  Additional plans based on his clinical course.    I discussed the patients findings and my recommendations with patient    I wore full protective equipment throughout the patient encounter including eye protection and facemask.  Hand hygiene was performed before donning protective equipment and after removal when leaving the room.    Fransisco Matt MD  02/20/21  12:20 EST

## 2021-02-20 NOTE — ANESTHESIA PROCEDURE NOTES
Airway  Urgency: elective    Date/Time: 2/20/2021 4:33 PM  Airway not difficult    General Information and Staff    Patient location during procedure: OR  Anesthesiologist: Shan Kay MD    Indications and Patient Condition  Indications for airway management: airway protection    Preoxygenated: yes  MILS maintained throughout  Mask difficulty assessment: 1 - vent by mask    Final Airway Details  Final airway type: endotracheal airway      Successful airway: ETT  Cuffed: yes   Successful intubation technique: direct laryngoscopy  Facilitating devices/methods: cricoid pressure  Endotracheal tube insertion site: oral  Blade: Luba  Blade size: 3  ETT size (mm): 8.0  Cormack-Lehane Classification: grade IIa - partial view of glottis  Placement verified by: chest auscultation and capnometry   Measured from: lips  ETT/EBT  to lips (cm): 23  Number of attempts at approach: 1  Assessment: lips, teeth, and gum same as pre-op and atraumatic intubation

## 2021-02-20 NOTE — ED PROVIDER NOTES
MD ATTESTATION NOTE    The ALOK and I have discussed this patient's history, physical exam, and treatment plan.  I have reviewed the documentation and personally had a face to face interaction with the patient. I affirm the documentation and agree with the treatment and plan.  The attached note describes my personal findings.      History  65-year-old male presents after fall with right ankle injury.  Pain occurred after a slip on ice while at work.    Physical Exam  Vital Signs reviewed  Alert, Well Appearing in NAD  Right ankle with obvious deformity and skin laceration noted.    EKG          EKG time: 1221  Rhythm/Rate: Atrial paced rhythm with a rate of 70  P waves and DE: Atrial paced P waves  QRS, axis: Left axis deviation, IVCD ventricular paced complexes are noted  ST and T waves: Nonspecific ST and T wave changes    Interpreted Contemporaneously by me, independently viewed  No prior to compare    Procedural Sedation    Date/Time: 2021 1:36 PM  Performed by: Emanuel Wilhelm MD  Authorized by: Fransisco Matt MD     Indications:     Procedure performed:  Fracture reduction    Procedure necessitating sedation performed by: CEM Montague.    Intended level of sedation:  Moderate (conscious sedation)  Pre-sedation assessment:     NPO status caution: urgency dictates proceeding with non-ideal NPO status      ASA classification: class 2 - patient with mild systemic disease      Neck mobility: normal      Mouth openin finger widths    Thyromental distance:  3 finger widths    Mallampati score:  II - soft palate, uvula, fauces visible    Pre-sedation assessments completed and reviewed: airway patency, anesthesia/sedation history, cardiovascular function, hydration status, mental status, nausea/vomiting, pain level, respiratory function and temperature      History of difficult intubation: no      Pre-sedation assessment completed:  2021 1:16 PM  Procedure details (see MAR for exact dosages):      Sedation start time:  2/20/2021 1:25 PM    Preoxygenation:  Nasal cannula    Sedation:  Propofol    Analgesia:  Hydromorphone    Intra-procedure monitoring:  Continuous capnometry, continuous pulse oximetry, frequent vital sign checks, cardiac monitor and blood pressure monitoring    Intra-procedure events: none      Sedation end time:  2/20/2021 1:37 PM    Total sedation time (minutes):  12  Post-procedure details:     Post-sedation assessment completed:  2/20/2021 1:37 PM    Attendance: Constant attendance by certified staff until patient recovered      Recovery: Patient returned to pre-procedure baseline      Patient is stable for discharge or admission: yes      Patient tolerance:  Tolerated well, no immediate complications            Disposition  I discussed evaluation and treatment of this patient with CEM Montague.  See above in procedure note for my assistance with fracture reduction.  Patient has been given IV antibiotics and will ultimately be admitted for operative repair of open ankle fracture.     Emanuel Wilhelm MD  02/20/21 2426

## 2021-02-20 NOTE — CONSULTS
[facility]   Consult Note    Patient Name: Joon Granados  : 1955  MRN: [id number]  Primary Care Physician: Provider, No Known  Referring Physician: Fransisco Schuler*  Date of admission: 2021    Inpatient Orthopedic Surgery Consult  Consult performed by: Sami Cortés Jr., MD  Consult ordered by: Fransisco Matt MD        Subjective   Subjective     Reason for Consult/ Chief Complaint: Right ankle pain    History of Present Illness     The patient is a very pleasant 65-year-old male [employer] patient with history of chronic heart failure, ICD placement, atrial fibrillation on Coumadin, hypertension, diabetes who was brought to the emergency department this morning after suffering a mechanical fall on ice.  He had immediate ankle pain and swelling and reports a wound over the medial ankle.  He was brought to the emergency department where radiographs showed a displaced ankle fracture dislocation.  This was confirmed to be an open ankle fracture via the medial wound as described by the emergency department staff.    He is on Coumadin.  INR in the ER was 2.7.    He localizes pain in the right ankle.  Pain can be a 7 out of 10.  It is sharp and aching.  It is worse with activity and better with rest.    While in the emergency department, his tetanus was brought up-to-date and he was given 2 g of Ancef.  The emergency department staff irrigated the open wound and placed the patient in a splint.    Review of Systems   Review of Systems:  Constitutional: Negative  HENT: Negative  Eyes: Negative  Respiratory: Negative; no shortness of breath  Cardiovascular: Negative; no current chest pain  Gastrointestinal: Negative  : Negative  Skin: Negative except as listed in HPI  Neurological: Negative, no numbness or deficits  Hematological: Negative  Muscoloskeletal: Per HPI               '      Personal History     Past Medical History:   Diagnosis Date   • Asthma    • Depression    •  Diabetes mellitus (CMS/HCC)    • Hypertension        Past Surgical History:   Procedure Laterality Date   • PACEMAKER IMPLANTATION     • TONSILLECTOMY         Family History: family history is not on file. Otherwise pertinent FHx was reviewed and not pertinent to current issue.    Social History:  reports that he has quit smoking. He does not have any smokeless tobacco history on file. He reports that he does not drink alcohol or use drugs.    Home Medications:  albuterol sulfate HFA, amiodarone, aspirin, carvedilol, levothyroxine, metFORMIN, sacubitril-valsartan, spironolactone, and warfarin      Allergies:  Allergies   Allergen Reactions   • Statins Hives       Objective    Objective   Vitals:  Temp:  [96.8 °F (36 °C)-97 °F (36.1 °C)] 97 °F (36.1 °C)  Heart Rate:  [65-88] 69  Resp:  [12-22] 18  BP: (111-134)/(70-92) 122/79  Flow (L/min):  [2] 2    Physical Exam   Physical Exam:  Vital signs reviewed.  Constitutional:  Appears well-developed, well nourished.  HEENT:  Head: normocephalic, atraumatic  Eyes: EOMI, grossly normal.  No scleral icterus.  Neck: Normal range of motion.  Supple, no tracheal deviation.  Cardiovascular: Regular rate.  Pulmonary: Effort normal, symmetric chest expansion, no labored breathing.  Abdominal: Soft, non distended  Neurological: No gross deficits, oriented to person, place, and time.  Skin: Warm and dry  Psychiatric: Normal mood and affect  Musculoskeletal:    Examination of his right lower extremity shows a well-padded short leg plaster splint in place.  Toes are warm and well-perfused with brisk capillary refill.  Sensation intact light touch over the toes.  No pain with passive stretch.  5 out of 5 EHL/FHL.        Result Review    Radiographs of the right ankle are independently reviewed.  These show a displaced fibular fracture with associated deltoid disruption/dislocation of the ankle.  Soft tissue gas noted consistent with open injury.    Assessment/Plan   Assessment / Plan      Brief Patient Summary:  Joon Granados is a 65 y.o. male with multiple medical comorbidities including atrial fibrillation on Coumadin with current INR 2.7, chronic systolic heart failure, diabetes presenting with an open displaced right ankle fracture dislocation    Active Hospital Problems:  Active Hospital Problems    Diagnosis   • **Open fracture dislocation of right ankle   • HTN (hypertension)   • Paroxysmal A-fib (CMS/HCC)   • Chronic systolic congestive heart failure (CMS/HCC)   • Cardiomyopathy (CMS/HCC)   • MARSHALL vs CKD 3a   • DM (diabetes mellitus), type 2 (CMS/HCC)       Plan:     I had a long and nell discussion with the patient about the severity of this injury.    Despite his multiple medical comorbidities, this does require urgent surgical intervention to include irrigation and debridement of the open fracture.  As his INR is currently 2.7, I have recommended staged management with close reduction with placement of an external fixator today rather than immediate open reduction internal fixation.  I think he has an unacceptably high wound healing problems/infection risk otherwise.    I discussed with him he would need to return for definitive surgery in 7 to 10 days to remove the external fixator and definitively fix his fracture.      The risks/benefits of surgery, including pain, infection, wound healing problems, need for future procedures, mal/nonunion, DVT/PE, cardiac event, and/or death were discussed, and the patient elected to proceed with surgery.      -Continue IV Ancef for open fracture  -Hold Coumadin for now; TEDs/SCD  -Pain control  -Disposition: Pending    Thank you for this consultation, please call with questions    Electronically signed by Sami Cortés Jr, MD, 02/20/21, 3:56 PM EST.

## 2021-02-20 NOTE — ED PROVIDER NOTES
EMERGENCY DEPARTMENT ENCOUNTER    Room Number:  04/04  Date seen:  2/20/2021  Time seen: 10:34 EST  PCP: No primary care provider on file.  Historian: patient      HPI:  Chief Complaint: right ankle pain    A complete HPI/ROS/PMH/PSH/SH/FH are unobtainable due to: none    Context: Joon Granados is a 65 y.o. male who presents to the ED for evaluation of right ankle pain that began just prior to arrival and is moderate to severe worse with any movement or range of motion and nothing makes it feel better.  He works for a company called "[employer]  He states that he was loading a canister into his truck while working today and slipped on the ice twisting his ankle.  He has severe pain in the area, EMS was called and he presents for further evaluation.  He does not know when his last Tdap was.  He is on warfarin for atrial fibrillation, last dose was last night.  He last ate around 5 AM today.        PAST MEDICAL HISTORY  Active Ambulatory Problems     Diagnosis Date Noted   • No Active Ambulatory Problems     Resolved Ambulatory Problems     Diagnosis Date Noted   • No Resolved Ambulatory Problems     Past Medical History:   Diagnosis Date   • Depression    • Hypertension          PAST SURGICAL HISTORY  Past Surgical History:   Procedure Laterality Date   • PACEMAKER IMPLANTATION           FAMILY HISTORY  History reviewed. No pertinent family history.      SOCIAL HISTORY  Social History     Tobacco Use   • Smoking status: Former Smoker   Substance and Sexual Activity   • Alcohol use: Never     Frequency: Never   • Drug use: Never         ALLERGIES  Statins        REVIEW OF SYSTEMS  Review of Systems     All systems reviewed and negative except for those discussed in HPI.       PHYSICAL EXAM  ED Triage Vitals   Temp Heart Rate Resp BP SpO2   02/20/21 1001 02/20/21 0958 02/20/21 0958 02/20/21 0958 02/20/21 0958   96.8 °F (36 °C) 88 14 116/70 98 %      Temp src Heart Rate Source Patient Position BP Location FiO2 (%)    02/20/21 1001 -- -- -- --   Tympanic             GENERAL: not distressed  HENT: atraumatic  EYES: no scleral icterus  CV: regular rhythm, regular rate  RESPIRATORY: normal effort CTA B  ABDOMEN: soft, nontender nondistended  MUSCULOSKELETAL: no deformity, no C-spine tenderness.  Significant swelling of the right.  Ankle with diffuse tenderness.  There is a 3 cm linear laceration over the medial ankle suggestive of open fracture.  No tenderness to the foot or metatarsals, no tenderness in the proximal tib-fib.  DP and PT pulse are 2+, cap refill is brisk, sensation intact distally, range of motion of the ankle significantly limited.  NEURO: alert, moves all extremities, follows commands  SKIN: warm, dry    Vital signs and nursing notes reviewed.          LAB RESULTS  No results found for this or any previous visit (from the past 24 hour(s)).    Ordered the above labs and independently reviewed the results.        RADIOLOGY  XR Ankle 3+ View Right    (Results Pending)   XR Tibia Fibula 2 View Right    (Results Pending)       I ordered the above noted radiological studies. Reviewed by me and discussed with radiologist.  See dictation for official radiology interpretation.    PROCEDURES  FX Dislocation    Date/Time: 2/20/2021 1:36 PM  Performed by: Deepika Montague PA  Authorized by: Emanuel Wilhelm MD     Consent:     Consent obtained:  Verbal    Consent given by:  Patient    Risks discussed:  Nerve damage, pain and vascular damage    Alternatives discussed:  No treatment  Universal protocol:     Procedure explained and questions answered to patient or proxy's satisfaction: yes      Relevant documents present and verified: yes      Test results available and properly labeled: yes      Imaging studies available: yes      Required blood products, implants, devices, and special equipment available: yes      Immediately prior to procedure, a time out was called: yes      Patient identity confirmed:  Verbally with  patient  Injury:     Injury location:  Ankle    Ankle injury location:  R ankle    Ankle fracture type: bimalleolar    Pre-procedure assessment:     Neurological function: normal      Distal perfusion: normal      Range of motion: reduced    Sedation:     Sedation type:  Moderate (conscious) sedation (performed by Dr. Wilhelm - see his documentation)  Procedure details:     Manipulation performed: yes      Skin traction used: yes      Reduction successful: yes      X-ray confirmed reduction: yes      Immobilization:  Splint    Splint type:  Ankle stirrup and short leg    Supplies used:  Elastic bandage, Ortho-Glass and cotton padding  Post-procedure assessment:     Distal perfusion: normal      Range of motion: unchanged      Patient tolerance of procedure:  Tolerated well, no immediate complications            MEDICATIONS GIVEN IN ER  Medications   ceFAZolin in dextrose (ANCEF) IVPB solution 2 g (has no administration in time range)   Tdap (BOOSTRIX) injection 0.5 mL (has no administration in time range)   ondansetron (ZOFRAN) injection 4 mg (4 mg Intravenous Given 2/20/21 1038)   morphine injection 4 mg (4 mg Intravenous Given 2/20/21 1038)             PROGRESS AND CONSULTS    DDX includes but not limited to open fracture, ankle fracture, tib-fib fracture, ankle sprain    ED Course as of Feb 20 1620   Sat Feb 20, 2021   1108 My interpretation of the right ankle and tib-fib x-rays is acute bimalleolar fracture dislocation of the ankle with soft tissue gas consistent with open fracture.    [facility].     [KA]   1137 I discussed the patient with Dr. Cortés, orthopedic surgeon on-call.  He recommends that we irrigate the wound reduce and splint the patient and admit to medicine.  He does recommend a dose of IV vitamin K to initiate reversal of his Coumadin, anticipate surgery this evening.    [KA]   1202 I discussed the patient with Dr. Matt who agrees to admit to med/surg for further evaluation and  treatment.    [KA]      ED Course User Index  [KA] Deepika Montague PA     I reassessed the patient multiple times.  He was given pain medicine for pain control.  Reduction successful in the emergency department.  He is agreeable with the plan for admission with plan for surgery today.       Reviewed pt's history and workup with Dr. Wilhelm.  After a bedside evaluation; they agree with the plan of care      Patient was placed in face mask in first look. Patient was wearing facemask each time I entered the room and throughout our encounter. I wore protective equipment throughout this patient encounter including a face mask, eye shield and gloves. Hand hygiene was performed before donning protective equipment and after removal when leaving the room.        DIAGNOSIS  Final diagnoses:   Open fracture dislocation of right ankle               Latest Documented Vital Signs:  As of 10:43 EST  BP- 116/70 HR- 88 Temp- 96.8 °F (36 °C) (Tympanic) O2 sat- 98%       Deepika Montague PA  02/20/21 1202

## 2021-02-20 NOTE — ANESTHESIA PREPROCEDURE EVALUATION
Anesthesia Evaluation     Patient summary reviewed and Nursing notes reviewed                Airway   Mallampati: II  TM distance: >3 FB  Neck ROM: full  No difficulty expected  Dental      Pulmonary - normal exam   (+) a smoker Former, asthma,  Cardiovascular     ECG reviewed  Rhythm: regular  Rate: normal    (+) pacemaker pacemaker, hypertension 2 medications or greater, dysrhythmias Paroxysmal Atrial Fib, CHF ,     ROS comment: Pacer/afib/cardiomyopathy  PE comment: Paced/afib    Neuro/Psych  (+) psychiatric history Depression,     GI/Hepatic/Renal/Endo    (+) obesity, morbid obesity,  renal disease CRI, diabetes mellitus type 2,     Musculoskeletal         ROS comment: Open right ankle fx, INR elevated so not doing ORIF today  Abdominal   (+) obese,    Substance History      OB/GYN          Other                      Anesthesia Plan    ASA 4     general     intravenous induction     Anesthetic plan, all risks, benefits, and alternatives have been provided, discussed and informed consent has been obtained with: patient.

## 2021-02-20 NOTE — OP NOTE
Procedure Note    Joon Granados  2/20/2021    Pre-op Diagnosis:   1.   Open right bimalleolar fracture dislocation  2.  Right ankle deltoid disruption       Post-Op Diagnosis Codes:  Same    Procedure:  1.  Irrigation and debridement, open right bimalleolar fracture dislocation  2.  Secondary reconstruction, right deltoid ligament  3.  Closed reduction under anesthesia, right bimalleolar fracture dislocation   4.  Application of multiplanar external fixator, right lower extremity    Surgeon:  Sami Cortés Jr., MD    Assistant:  Richardson Solano MD    The services of a first assist were necessary for performing the procedure safely and expeditiously.  Dr. Solano was present for the entire duration of the case and helped with positioning, prepping and draping, retraction, reduction of the fractures, hardware placement, application of the fixator, and closure of the incision.    Anesthesia: General with block    Estimated Blood Loss: 20cc    Specimens:                None      Drains: * No LDAs found *    Complications:   None apparent    Disposition:  Stable to PACU for recovery    Indications for procedure:  The patient is a 65-year-old male with multiple medical comorbidities including atrial fibrillation on Coumadin with an INR of 2.7, chronic systolic heart failure with ejection fracture of 25%, diabetes who presented with an open fracture dislocation of the right ankle.  Given the open nature of the ankle fracture, his INR of 2.7 and his multiple medical comorbidities, staged management was recommended.  The above listed procedures were recommended.  The risks/benefits of surgery, including pain, infection, wound healing problems, need for future procedures, mal/nonunion, DVT/PE, cardiac event, and/or death were discussed, and the patient elected to proceed with surgery.    Procedure in detail:  The correct patient was identified in preoperative holding.  All risks and benefits of surgery were again discussed in  detail, and the patient agreed to proceed with surgery.  The operative extremity was confirmed and marked by myself.  Operative consent reviewed and confirmed to be signed by the patient and myself.    At this time, the patient was wheeled to the operative theatre and placed supine on the OR table.  MELISSA/SCD placed on nonoperative leg. Anesthesia was induced smoothly by our anesthesia colleagues.   Well padded nonsterile tourniquet placed on the upper thigh. The right lower extremity was prepped and draped in standard sterile fashion.  Appropriate presurgical timeout was performed, confirming correct patient, correct extremity, correct procedure, availability of sterile instruments/implants, and the administration of intravenous antibiotics in the form of Ancef within one hour of skin incision.    The patient's medial wound was examined.  This was a largely transverse wound at the level of the medial malleolus.  It was approximately 3 cm in length.  This was extended anteriorly and posteriorly.  Dissection was carried down bluntly to the medial malleolus.  The  the medial malleolus was completely bare without residual deltoid tissue.    A curette and rongeur were used to debride the wound including the anteromedial ankle joint and the medial malleolus bone.  Care was taken to avoid protecting branches of the saphenous vein and nerve.    A knife was used to resect the skin edges back to healthy margins.    Once I was satisfied a thorough debridement of the open fracture dislocation had been performed, the ankle joint and medial wound was thoroughly irrigated with 3 L of sterile saline impregnated with bacitracin.    Given that the patient already had a large open wound, I elected to proceed with secondary reconstruction of the deltoid ligament.  We prepared the anterior/distal face of the medial malleolus with rongeur and then drilled and placed a single [employer] 1.45 double-loaded Juggernaut anchor in the medial  malleolus, and in a pants-over-vest fashion repaired the deltoid sleeve avulsion back to the medial malleolus using the passed #1 Maxbraid sutures, and repair oversewn with 2-0 Vicryl suture, completing the secondary reconstruction.    At this time, the wound was again irrigated and closed with 3-0 Vicryl and 3-0 nylon.    At this time, one 5.0mm threaded pin from the [employer] large external fixator set was placed through a small stab incision into the anteromedial face of the tibia, well proximal to the zone of injury/fracture site.  This was advanced until bicortical purchase was obtained.  The four pin clamp was used to localize a second stab incision for a second 5.0mm threaded pin placed in similar fashion.  AP and lateral fluoroscopy confirmed appropriate and bicortical fashion.    Attention was turned distally.  Under fluoroscopy, the partially threaded calcaneal pin was placed in idealized fashion in the posterior tuberosity, from lateral to medial. This pin was set in an orthogonal, separate plane from that of the tibial pins. Excellent purchase obtained.    I then attached two rods to the proximal medial and lateral aspects of the 4 pin connector using bar-to-bar attachments.  These were tightened down, as well as the 4 pin connector.    The bars were then placed on the distal calcaneal pin in diverging planes using bar to bar connectors but not tightened.    The fracture was then reduced using manual traction and manipulation in closed fashion. The distal connectors were then tightened appropriately.    However, the ankle fracture was noted to be very unstable.  There was persistent widening at the syndesmosis with lateral displacement of the fibula and talus.    I thus made a small stab incision over the proximal diaphysis of the first metatarsal.  Dissection was carried down bluntly to the metatarsal avoiding the EHL tendon.  A 4.0 mm threaded pin was placed in bicortical fashion.  This  "was then connected to the external fixator in a separate, multiplanar fashion.      Fluoroscopy showed improvement but there was still lateral subluxation of the syndesmosis/fibula and talus.    I thus made a small stab incision over the tibia more distally.  A 5.0 mm threaded pin was placed in bicortical fashion.  This was then connected to the existing frame in multiplanar fashion.      I now had good control of the ankle and distal tibia.  Using a combination of dorsiflexion, supination and traction, I was able to reduce the fibular and posterior malleolar fractures.    Biplanar fluoroscopy confirmed excellent reduction of the tibiotalar joint.  The talus remained well reduced and fractures were out to length.    All connections were confirmed to be tight.  Xeroform and Kerlix placed around each pin site.  Cast padding and Ace were used to overwrap the construct.     Drapes taken down, patient awoken from anesthesia and transported to PACU for recovery.    Postoperative Plan:  Weightbearing status: Non-weightbearing in the external fixator  DVT Prophylaxis:  Patient will be instructed to elevate as much as possible (\"toes above the nose\") and to get up and move around at least once per hour while awake to help minimize risk of blood clots.    I discussed the severity of this injury with the patient as well as his wife.  They understand he will require definitive surgery with external fixator removal in the next 7 to 10 days.  I suspect he would benefit from conversion to Coumadin to a Lovenox bridge by the hospitalist team during this setting.                            Sami Cortés Jr, MD     Date: 2/20/2021  Time: 18:34 EST        "

## 2021-02-20 NOTE — PROGRESS NOTES
Clinical Pharmacy Services: Medication History    Joon Granados is a 65 y.o. male presenting to [facility] for Open fracture dislocation of right ankle [S83.692P]    He  has a past medical history of Depression and Hypertension.    Allergies as of 02/20/2021 - Reviewed 02/20/2021   Allergen Reaction Noted   • Statins Hives 02/20/2021       Medication information was obtained from: Patient  Pharmacy and Phone Number: [address] - [phone] [facility] [phone]         Prior to Admission Medications     Prescriptions Last Dose Informant Patient Reported? Taking?    albuterol sulfate  (90 Base) MCG/ACT inhaler 2/20/2021 Self Yes Yes    Inhale 2 puffs Every 4 (Four) Hours As Needed for Wheezing.    amiodarone (PACERONE) 200 MG tablet 2/20/2021 Self Yes Yes    Take 100 mg by mouth Daily.    aspirin (aspirin) 81 MG EC tablet 2/20/2021 Self Yes Yes    Take 81 mg by mouth Daily.    carvedilol (COREG) 25 MG tablet 2/20/2021 Self Yes Yes    Take 25 mg by mouth 2 (Two) Times a Day With Meals.    levothyroxine (SYNTHROID, LEVOTHROID) 75 MCG tablet 2/20/2021 Self Yes Yes    Take 75 mcg by mouth Daily.    metFORMIN (GLUCOPHAGE) 1000 MG tablet 2/20/2021 Self Yes Yes    Take 1,000 mg by mouth 2 (Two) Times a Day With Meals.    sacubitril-valsartan (Entresto) 24-26 MG tablet 2/20/2021 Self Yes Yes    Take 1 tablet by mouth 2 (Two) Times a Day.    spironolactone (ALDACTONE) 25 MG tablet 2/20/2021 Self Yes Yes    Take 25 mg by mouth Daily.    warfarin (COUMADIN) 7.5 MG tablet 2/19/2021 Self Yes Yes    Take 7.5 mg by mouth Daily.            Medication notes:     This medication list is complete to the best of my knowledge as of 2/20/2021    Please call if questions.    Jamir Vallecillo [facility]  2/20/2021 13:27 EST

## 2021-02-20 NOTE — ED NOTES
Pt to this ED via EMS c/o slipping on ice, landing on R ankle with visible deformity.  C/o 9/10 pain currently; arrived with splint securing ankle; 3+ pulse R DP, cap refill less than 3 seconds.     Rene Monet RN  02/20/21 1004

## 2021-02-21 VITALS
BODY MASS INDEX: 36.73 KG/M2 | HEART RATE: 75 BPM | DIASTOLIC BLOOD PRESSURE: 78 MMHG | HEIGHT: 72 IN | WEIGHT: 271.2 LBS | TEMPERATURE: 97.5 F | SYSTOLIC BLOOD PRESSURE: 112 MMHG | RESPIRATION RATE: 16 BRPM | OXYGEN SATURATION: 97 %

## 2021-02-21 LAB
ANION GAP SERPL CALCULATED.3IONS-SCNC: 8.9 MMOL/L (ref 5–15)
BASOPHILS # BLD AUTO: 0.06 10*3/MM3 (ref 0–0.2)
BASOPHILS NFR BLD AUTO: 0.6 % (ref 0–1.5)
BUN SERPL-MCNC: 16 MG/DL (ref 8–23)
BUN/CREAT SERPL: 13 (ref 7–25)
CALCIUM SPEC-SCNC: 8.4 MG/DL (ref 8.6–10.5)
CHLORIDE SERPL-SCNC: 103 MMOL/L (ref 98–107)
CO2 SERPL-SCNC: 25.1 MMOL/L (ref 22–29)
CREAT SERPL-MCNC: 1.23 MG/DL (ref 0.76–1.27)
DEPRECATED RDW RBC AUTO: 38.7 FL (ref 37–54)
EOSINOPHIL # BLD AUTO: 0.28 10*3/MM3 (ref 0–0.4)
EOSINOPHIL NFR BLD AUTO: 2.9 % (ref 0.3–6.2)
ERYTHROCYTE [DISTWIDTH] IN BLOOD BY AUTOMATED COUNT: 12.1 % (ref 12.3–15.4)
GFR SERPL CREATININE-BSD FRML MDRD: 59 ML/MIN/1.73
GLUCOSE BLDC GLUCOMTR-MCNC: 111 MG/DL (ref 70–130)
GLUCOSE BLDC GLUCOMTR-MCNC: 125 MG/DL (ref 70–130)
GLUCOSE BLDC GLUCOMTR-MCNC: 130 MG/DL (ref 70–130)
GLUCOSE SERPL-MCNC: 130 MG/DL (ref 65–99)
HCT VFR BLD AUTO: 37.3 % (ref 37.5–51)
HGB BLD-MCNC: 12 G/DL (ref 13–17.7)
IMM GRANULOCYTES # BLD AUTO: 0.04 10*3/MM3 (ref 0–0.05)
IMM GRANULOCYTES NFR BLD AUTO: 0.4 % (ref 0–0.5)
INR PPP: 1.47 (ref 0.9–1.1)
LYMPHOCYTES # BLD AUTO: 0.95 10*3/MM3 (ref 0.7–3.1)
LYMPHOCYTES NFR BLD AUTO: 9.8 % (ref 19.6–45.3)
MCH RBC QN AUTO: 28.4 PG (ref 26.6–33)
MCHC RBC AUTO-ENTMCNC: 32.2 G/DL (ref 31.5–35.7)
MCV RBC AUTO: 88.2 FL (ref 79–97)
MONOCYTES # BLD AUTO: 0.71 10*3/MM3 (ref 0.1–0.9)
MONOCYTES NFR BLD AUTO: 7.3 % (ref 5–12)
NEUTROPHILS NFR BLD AUTO: 7.69 10*3/MM3 (ref 1.7–7)
NEUTROPHILS NFR BLD AUTO: 79 % (ref 42.7–76)
NRBC BLD AUTO-RTO: 0 /100 WBC (ref 0–0.2)
PLATELET # BLD AUTO: 153 10*3/MM3 (ref 140–450)
PMV BLD AUTO: 13 FL (ref 6–12)
POTASSIUM SERPL-SCNC: 4.4 MMOL/L (ref 3.5–5.2)
PROTHROMBIN TIME: 17.6 SECONDS (ref 11.7–14.2)
RBC # BLD AUTO: 4.23 10*6/MM3 (ref 4.14–5.8)
SODIUM SERPL-SCNC: 137 MMOL/L (ref 136–145)
WBC # BLD AUTO: 9.73 10*3/MM3 (ref 3.4–10.8)

## 2021-02-21 PROCEDURE — A9270 NON-COVERED ITEM OR SERVICE: HCPCS | Performed by: NURSE PRACTITIONER

## 2021-02-21 PROCEDURE — 63710000001 ASPIRIN 81 MG TABLET DELAYED-RELEASE: Performed by: NURSE PRACTITIONER

## 2021-02-21 PROCEDURE — 63710000001 HYDROCODONE-ACETAMINOPHEN 5-325 MG TABLET: Performed by: ORTHOPAEDIC SURGERY

## 2021-02-21 PROCEDURE — A9270 NON-COVERED ITEM OR SERVICE: HCPCS | Performed by: ORTHOPAEDIC SURGERY

## 2021-02-21 PROCEDURE — 25010000002 ENOXAPARIN PER 10 MG: Performed by: INTERNAL MEDICINE

## 2021-02-21 PROCEDURE — 63710000001 LEVOTHYROXINE 75 MCG TABLET: Performed by: NURSE PRACTITIONER

## 2021-02-21 PROCEDURE — G0378 HOSPITAL OBSERVATION PER HR: HCPCS

## 2021-02-21 PROCEDURE — 80048 BASIC METABOLIC PNL TOTAL CA: CPT | Performed by: ORTHOPAEDIC SURGERY

## 2021-02-21 PROCEDURE — 63710000001 OXYCODONE-ACETAMINOPHEN 7.5-325 MG TABLET: Performed by: ORTHOPAEDIC SURGERY

## 2021-02-21 PROCEDURE — 85610 PROTHROMBIN TIME: CPT | Performed by: ORTHOPAEDIC SURGERY

## 2021-02-21 PROCEDURE — 63710000001 DOCUSATE SODIUM 100 MG CAPSULE: Performed by: ORTHOPAEDIC SURGERY

## 2021-02-21 PROCEDURE — 63710000001 AMIODARONE 200 MG TABLET: Performed by: NURSE PRACTITIONER

## 2021-02-21 PROCEDURE — 63710000001 CARVEDILOL 25 MG TABLET: Performed by: NURSE PRACTITIONER

## 2021-02-21 PROCEDURE — 85025 COMPLETE CBC W/AUTO DIFF WBC: CPT | Performed by: ORTHOPAEDIC SURGERY

## 2021-02-21 PROCEDURE — 97161 PT EVAL LOW COMPLEX 20 MIN: CPT | Performed by: PHYSICAL THERAPIST

## 2021-02-21 PROCEDURE — 63710000001 SACUBITRIL-VALSARTAN 24-26 MG TABLET: Performed by: NURSE PRACTITIONER

## 2021-02-21 PROCEDURE — 25010000003 CEFAZOLIN IN DEXTROSE 2-4 GM/100ML-% SOLUTION: Performed by: ORTHOPAEDIC SURGERY

## 2021-02-21 PROCEDURE — 82962 GLUCOSE BLOOD TEST: CPT

## 2021-02-21 RX ORDER — ACETAMINOPHEN 325 MG/1
650 TABLET ORAL EVERY 4 HOURS PRN
Start: 2021-02-21

## 2021-02-21 RX ORDER — OXYCODONE AND ACETAMINOPHEN 7.5; 325 MG/1; MG/1
1 TABLET ORAL EVERY 4 HOURS PRN
Qty: 30 TABLET | Refills: 0 | Status: SHIPPED | OUTPATIENT
Start: 2021-02-21 | End: 2022-02-21

## 2021-02-21 RX ORDER — PSEUDOEPHEDRINE HCL 30 MG
100 TABLET ORAL 2 TIMES DAILY
Qty: 20 CAPSULE | Refills: 0 | Status: SHIPPED | OUTPATIENT
Start: 2021-02-21 | End: 2021-03-03

## 2021-02-21 RX ADMIN — CEFAZOLIN SODIUM 2 G: 2 INJECTION, SOLUTION INTRAVENOUS at 08:37

## 2021-02-21 RX ADMIN — CEFAZOLIN SODIUM 2 G: 2 INJECTION, SOLUTION INTRAVENOUS at 00:57

## 2021-02-21 RX ADMIN — HYDROCODONE BITARTRATE AND ACETAMINOPHEN 1 TABLET: 5; 325 TABLET ORAL at 08:37

## 2021-02-21 RX ADMIN — OXYCODONE HYDROCHLORIDE AND ACETAMINOPHEN 2 TABLET: 7.5; 325 TABLET ORAL at 04:53

## 2021-02-21 RX ADMIN — CEFAZOLIN SODIUM 2 G: 2 INJECTION, SOLUTION INTRAVENOUS at 16:18

## 2021-02-21 RX ADMIN — DOCUSATE SODIUM 100 MG: 100 CAPSULE, LIQUID FILLED ORAL at 08:37

## 2021-02-21 RX ADMIN — LEVOTHYROXINE SODIUM 75 MCG: 0.07 TABLET ORAL at 04:53

## 2021-02-21 RX ADMIN — ASPIRIN 81 MG: 81 TABLET, COATED ORAL at 08:37

## 2021-02-21 RX ADMIN — ENOXAPARIN SODIUM 120 MG: 120 INJECTION SUBCUTANEOUS at 11:32

## 2021-02-21 RX ADMIN — SACUBITRIL AND VALSARTAN 1 TABLET: 24; 26 TABLET, FILM COATED ORAL at 08:37

## 2021-02-21 RX ADMIN — CARVEDILOL 25 MG: 25 TABLET, FILM COATED ORAL at 08:37

## 2021-02-21 RX ADMIN — OXYCODONE HYDROCHLORIDE AND ACETAMINOPHEN 2 TABLET: 7.5; 325 TABLET ORAL at 13:59

## 2021-02-21 RX ADMIN — AMIODARONE HYDROCHLORIDE 100 MG: 200 TABLET ORAL at 08:37

## 2021-02-21 NOTE — THERAPY EVALUATION
Patient Name: Joon Granados  : 1955    MRN: [id number]                              Today's Date: 2021       Admit Date: 2021    Visit Dx:     ICD-10-CM ICD-9-CM   1. Open fracture dislocation of right ankle  S82.891B 824.9     Patient Active Problem List   Diagnosis   • HTN (hypertension)   • Paroxysmal A-fib (CMS/HCC)   • Chronic systolic congestive heart failure (CMS/HCC)   • Cardiomyopathy (CMS/HCC)   • MARSHALL vs CKD 3a   • DM (diabetes mellitus), type 2 (CMS/HCC)   • Open fracture dislocation of right ankle     Past Medical History:   Diagnosis Date   • Asthma    • Depression    • Diabetes mellitus (CMS/HCC)    • Hypertension      Past Surgical History:   Procedure Laterality Date   • PACEMAKER IMPLANTATION     • TONSILLECTOMY       General Information     Row Name 21 [facility]6          Physical Therapy Time and Intention    Document Type  evaluation  -     Mode of Treatment  individual therapy;physical therapy  -     Row Name 21 1326          General Information    Prior Level of Function  independent:  -     Existing Precautions/Restrictions  fall;non-weight bearing  -     Barriers to Rehab  none identified  -     Row Name 21 1326          Living Environment    Lives With  child(mary), adult  -     Row Name 21 1326          Home Main Entrance    Number of Stairs, Main Entrance  -- has a ramp  -     Row Name 21 1326          Cognition    Orientation Status (Cognition)  oriented x 3  -       User Key  (r) = Recorded By, (t) = Taken By, (c) = Cosigned By    Initials Name Provider Type     Laura Kong, PT Physical Therapist        Mobility     Row Name 21 1327          Bed Mobility    Bed Mobility  supine-sit  -     Supine-Sit [place] (Bed Mobility)  supervision;verbal cues  -     Row Name 21 132          Sit-Stand Transfer    Sit-Stand [place] (Transfers)  minimum assist (75% patient effort)  -     Assistive Device  (Sit-Stand Transfers)  walker, front-wheeled  -     Row Name 02/21/21 1327          Gait/Stairs (Locomotion)    [place] Level (Gait)  contact guard  -     Assistive Device (Gait)  walker, front-wheeled  -     Distance in Feet (Gait)  12  -KH     Deviations/Abnormal Patterns (Gait)  gait speed decreased  -     Comment (Gait/Stairs)  maintained NWB  -       User Key  (r) = Recorded By, (t) = Taken By, (c) = Cosigned By    Initials Name Provider Type    Laura Matthew, FERNANDO Physical Therapist        Obj/Interventions     Row Name 02/21/21 1328          Range of Motion Comprehensive    Comment, General Range of Motion  WFL except R ankle  -[facility] Name 02/21/21 1328          Strength Comprehensive (MMT)    Comment, General Manual Muscle Testing (MMT) Assessment  BLE WFL  -[facility] Name 02/21/21 1328          Balance    Balance Assessment  sitting static balance;sitting dynamic balance;standing static balance;standing dynamic balance  -     Static Sitting Balance  WNL  -     Dynamic Sitting Balance  WNL  -     Static Standing Balance  mild impairment  -     Dynamic Standing Balance  mild impairment  -       User Key  (r) = Recorded By, (t) = Taken By, (c) = Cosigned By    Initials Name Provider Type    Laura Matthew, FERNANDO Physical Therapist        Goals/Plan    No documentation.       Clinical Impression     [facility] Name 02/21/21 1329          Pain    Additional Documentation  Pain Scale: Numbers Pre/Post-Treatment (Group)  -KH     Row Name 02/21/21 1329          Pain Scale: Numbers Pre/Post-Treatment    Pretreatment Pain Rating  0/10 - no pain  -     Posttreatment Pain Rating  0/10 - no pain  -[facility] Name 02/21/21 1329          Plan of Care Review    Plan of Care Reviewed With  patient  -     Outcome Summary  Pt admitted with open fracture and dislocation of R ankle. Pt is now s/p external fixation and is NWB. He plans to d/c home with family asistance and has a Wx,  knee scooter and wheechair at home. He also has a ramp to enter. Pt was able to hop 12 ft to restroom with RWx with CGA and was indepednent for bed mobility. He is safe to d/c home with family and has equipment at home. Pt was educated on use of AD, NWB status and home safety. PT will sign off.  -     Row Name 02/21/21 1329          Therapy Assessment/Plan (PT)    Patient/Family Therapy Goals Statement (PT)  home with family  -     Row Name 02/21/21 1329          Positioning and Restraints    Pre-Treatment Position  in bed  -     Post Treatment Position  bathroom  -     Bathroom  sitting;call light within reach;encouraged to call for assist;notified nsg  -       User Key  (r) = Recorded By, (t) = Taken By, (c) = Cosigned By    Initials Name Provider Type    Laura Matthew PT Physical Therapist        Outcome Measures     Row Name 02/21/21 9233          How much help from another person do you currently need...    Turning from your back to your side while in flat bed without using bedrails?  4  -KH     Moving from lying on back to sitting on the side of a flat bed without bedrails?  4  -KH     Moving to and from a bed to a chair (including a wheelchair)?  3  -KH     Standing up from a chair using your arms (e.g., wheelchair, bedside chair)?  3  -KH     Climbing 3-5 steps with a railing?  2  -KH     To walk in hospital room?  3  -KH     AM-PAC 6 Clicks Score (PT)  19  -[facility] Name 02/21/21 9233          Functional Assessment    Outcome Measure Options  AM-PAC 6 Clicks Basic Mobility (PT)  -       User Key  (r) = Recorded By, (t) = Taken By, (c) = Cosigned By    Initials Name Provider Type    Laura Matthew PT Physical Therapist          PT Recommendation and Plan     Plan of Care Reviewed With: patient  Outcome Summary: Pt admitted with open fracture and dislocation of R ankle. Pt is now s/p external fixation and is NWB. He plans to d/c home with family asistance and has a Wx,  knee scooter and wheechair at home. He also has a ramp to enter. Pt was able to hop 12 ft to restroom with RWx with CGA and was indepednent for bed mobility. He is safe to d/c home with family and has equipment at home. Pt was educated on use of AD, NWB status and home safety. PT will sign off.     Time Calculation:   PT Charges     Row Name 02/21/21 1333             Time Calculation    Start Time  1126  -KH      Stop Time  1140  -KH      Time Calculation (min)  14 min  -KH        User Key  (r) = Recorded By, (t) = Taken By, (c) = Cosigned By    Initials Name Provider Type    Laura Matthew, FERNANDO Physical Therapist        Therapy Charges for Today     Code Description Service Date Service Provider Modifiers Qty    [id number] HC PT EVAL LOW COMPLEXITY 1 2/21/2021 Laura Kong PT GP 1          PT G-Codes  Outcome Measure Options: AM-PAC 6 Clicks Basic Mobility (PT)  AM-PAC 6 Clicks Score (PT): 19    Laura Kong PT  2/21/2021

## 2021-02-21 NOTE — DISCHARGE SUMMARY
Date of Admission: 2/20/2021  Date of Discharge:  2/21/2021  Primary Care Physician: Provider, No Known     Discharge Diagnosis:  Active Hospital Problems    Diagnosis  POA   • **Open fracture dislocation of right ankle [S82.891B]  Unknown   • HTN (hypertension) [I10]  Unknown   • Paroxysmal A-fib (CMS/HCC) [I48.0]  Unknown   • Chronic systolic congestive heart failure (CMS/HCC) [I50.22]  Unknown   • Cardiomyopathy (CMS/HCC) [I42.9]  Unknown   • MARSHALL vs CKD 3a [N17.9]  Unknown   • DM (diabetes mellitus), type 2 (CMS/HCC) [E11.9]  Unknown      Resolved Hospital Problems   No resolved problems to display.       DETAILS OF HOSPITAL STAY     Pertinent Test Results and Procedures Performed    X-ray of right tib-fib and right ankle:   There is a severe fracture dislocation with fracture of the   distal fibula near the lateral malleolus combined with widening of the   ankle mortise medially and anterior dislocation of the tibia relative to   the talus as seen in the lateral view.     Chest x-ray:  There is a severe fracture dislocation with fracture of the   distal fibula near the lateral malleolus combined with widening of the   ankle mortise medially and anterior dislocation of the tibia relative to   the talus as seen in the lateral view.     Hospital Course  This is a very pleasant 65-year-old male with history of chronic systolic congestive heart failure, cardiomyopathy, placement of ICD in the distant past, paroxysmal A. fib, hypertension and diabetes who presented to the emergency room after he twisted his right ankle with severe force which caused a open fracture and dislocation.  He was admitted and orthopedic surgery evaluated.  They performed irrigation and debridement along with secondary reconstruction and closed reduction of his right bimalleolar fracture with external fixator placed.  This was performed yesterday.  His INR was reversed prior to this.  He is doing well today.  He has no evidence of  decompensated heart failure and his A. fib is well controlled.  Orthopedic surgery has had a long discussion with him today regarding the fact that he will require a second procedure in about 7 to 10 days and if the patient was from [place] they would be seeing him in the office this week to follow closely.  The patient is from [place] of [facility].  He does not wish to stay in the [place] area for the next week or so for this care and states that he will be able to see his orthopedist in [place].  Orthopedic surgery here is okay with this plan.  He does have the backup option of contacting Dr. Cortés of orthopedic surgery and coming back to [place] for his care should his orthopedist in [place] not be available or capable of handling this particular issue.  Given the fact that he is going to require a second procedure in the near future we will keep him on full dose Lovenox for blood thinning purposes both for his A. fib and DVT prophylaxis.  He is medically stable and given his discussion with orthopedic surgery this morning as documented in their note we will discharge him and he will return to [place].    Physical Exam at Discharge:  General: No acute distress, AAOx3  HEENT: EOMI, PERRL  Cardiovascular: +s1 and s2, RRR  Lungs: No rhonchi or wheezing  Abdomen: soft, nontender  Extremities: Right foot and ankle are dressed and wrapped with external fixator in place    Consults:   Consults     Date and Time Order Name Status Description    2/20/2021 1219 Inpatient Orthopedic Surgery Consult Completed     2/20/2021 1137 [facility] (on-call MD unless specified) Details Completed     2/20/2021 1108 Ortho (on-call MD unless specified) Completed             Condition on Discharge: Stable    Discharge Disposition  Home or Self Care    Discharge Medications     Discharge Medications      New Medications      Instructions Start Date   acetaminophen 325 MG tablet  Commonly known as: TYLENOL   650 mg, Oral, Every 4 Hours  PRN      docusate sodium 100 MG capsule   100 mg, Oral, 2 Times Daily      enoxaparin 120 MG/0.8ML solution syringe  Commonly known as: LOVENOX   120 mg, Subcutaneous, Every 12 Hours      oxyCODONE-acetaminophen 7.5-325 MG per tablet  Commonly known as: Percocet   1 tablet, Oral, Every 4 Hours PRN         Continue These Medications      Instructions Start Date   albuterol sulfate  (90 Base) MCG/ACT inhaler  Commonly known as: PROVENTIL HFA;VENTOLIN HFA;PROAIR HFA   2 puffs, Inhalation, Every 4 Hours PRN      amiodarone 200 MG tablet  Commonly known as: PACERONE   100 mg, Oral, Daily      aspirin 81 MG EC tablet   81 mg, Oral, Daily      carvedilol 25 MG tablet  Commonly known as: COREG   25 mg, Oral, 2 Times Daily With Meals      Entresto 24-26 MG tablet  Generic drug: sacubitril-valsartan   1 tablet, Oral, 2 Times Daily      levothyroxine 75 MCG tablet  Commonly known as: SYNTHROID, LEVOTHROID   75 mcg, Oral, Daily      metFORMIN 1000 MG tablet  Commonly known as: GLUCOPHAGE   1,000 mg, Oral, 2 Times Daily With Meals      spironolactone 25 MG tablet  Commonly known as: ALDACTONE   25 mg, Oral, Daily         Stop These Medications    warfarin 7.5 MG tablet  Commonly known as: COUMADIN            Discharge Diet:   Diet Instructions     Diet: Regular, Consistent Carbohydrate      Discharge Diet:  Regular  Consistent Carbohydrate             Activity at Discharge:   Activity Instructions     Activity as Tolerated            Follow-up Appointments  No future appointments.  Additional Instructions for the Follow-ups that You Need to Schedule     Discharge Follow-up with PCP   As directed       Currently Documented PCP:    Provider, No Known    PCP Phone Number:    [phone]     Follow Up Details: 1-2 weeks         Discharge Follow-up with Specialty: Your orthopedist in [place] this coming week or with Dr. Cortés here in [place] if your orthopedist is unavailable.   As directed      Specialty: Your orthopedist in  [place] this coming week or with Dr. Cortés here in [place] if your orthopedist is unavailable.             I have examined and discussed discharge planning with the patient today.    I wore full protective equipment throughout the patient encounter including eye protection and facemask.  Hand hygiene was performed before donning protective equipment and after removal when leaving the room.     Fransisco Matt MD  02/21/21  09:51 EST    Time: Discharge 25 min

## 2021-02-21 NOTE — PLAN OF CARE
Goal Outcome Evaluation:  Plan of Care Reviewed With: patient     Outcome Summary: Pt admitted with open fracture and dislocation of R ankle. Pt is now s/p external fixation and is NWB. He plans to d/c home with family asistance and has a Wx, knee scooter and wheechair at home. He also has a ramp to enter. Pt was able to hop 12 ft to restroom with RWx with CGA and was indepednent for bed mobility. He is safe to d/c home with family and has equipment at home. Pt was educated on use of AD, NWB status and home safety. PT will sign off.

## 2021-02-21 NOTE — PLAN OF CARE
Goal Outcome Evaluation:  Plan of Care Reviewed With: patient  Progress: improving  Outcome Summary: patient admitted from PACU at 2015. pain well controlled. RLE elevated on pillows. vitals stable

## 2021-02-21 NOTE — ANESTHESIA PROCEDURE NOTES
Peripheral Block      Patient location during procedure: post-op  Start time: 2/20/2021 7:30 PM  Stop time: 2/20/2021 7:40 PM  Reason for block: at surgeon's request and post-op pain management  Performed by  Anesthesiologist: Shyann White MD  Preanesthetic Checklist  Completed: patient identified, site marked, surgical consent, pre-op evaluation, timeout performed, IV checked, risks and benefits discussed and monitors and equipment checked  Prep:  Pt Position: supine  Sterile barriers:gloves  Prep: ChloraPrep  Patient monitoring: continuous pulse oximetry, blood pressure monitoring and EKG  Procedure  Sedation:yes  Performed under: PNB  Guidance:ultrasound guided  ULTRASOUND INTERPRETATION. Using ultrasound guidance a gauge needle was placed in close proximity to the sciatic nerve, at which point, under ultrasound guidance anesthetic was injected in the area of the nerve and spread of the anesthesia was seen on ultrasound in close proximity thereto.  There were no abnormalities seen on ultrasound; a digital image was taken; and the patient tolerated the procedure with no complications. Images:still images obtained, printed/placed on chart    Laterality:right  Block Type:popliteal  Injection Technique:single-shot  Needle Type:echogenic  Needle Gauge:20 G      Medications Used: ropivacaine (NAROPIN) 0.5 % injection, 30 mL  mepivacaine (CARBOCAINE) 1.5 % injection, 10 mL      Post Assessment  Injection Assessment: negative aspiration for heme, no paresthesia on injection and incremental injection  Patient Tolerance:comfortable throughout block  Complications:no

## 2021-02-21 NOTE — PROGRESS NOTES
Orthopedic Progress Note      Patient: Joon Granados    Date of Admission: 2/20/2021 10:01 AM  YOB: 1955  Medical Record Number: [id number]    Attending Physician: Fransisco Matt*  Consulting Physician: JON Purcell    Post-Operative Day: 1    Procedure(s):  rightANKLE EXTERNAL FIXATOR APPLICATION  INCISION AND DRAINAGE RIGHT ANKLE WOUND    Subjective :    Systemic or Specific Complaints: No Complaints    Pain Relief: the patient does toes are warm to touch and sensation is intact, but just now getting return of dorsiflexion and plantar flexion    Activity: up with assistance    Weight Bearing: NWB RLE    Objective :    Vital signs in last 24 hours:  Temp:  [96.8 °F (36 °C)-98.3 °F (36.8 °C)] 97.5 °F (36.4 °C)  Heart Rate:  [65-88] 70  Resp:  [12-22] 16  BP: (108-141)/(65-94) 108/65  Vitals:    02/20/21 2310 02/21/21 0315 02/21/21 0447 02/21/21 0704   BP: 130/84 123/79  108/65   BP Location: Left arm Left arm  Left arm   Patient Position: Lying Lying  Lying   Pulse: 77 80  70   Resp: 16 16  16   Temp: 97.3 °F (36.3 °C) 96.9 °F (36.1 °C)  97.5 °F (36.4 °C)   TempSrc: Skin Skin  Skin   SpO2: 97% 94% 95% 93%   Weight:       Height:           Physical Examination:   General: Patient alert and oriented x 4 spheres.   Dressing: Clean, dry and intact  Position:  Alignment excellent   Extremity: Calf soft, non-tender with distal circulation intact. Block effective.    Diagnostic Test Review:    Results from last 7 days   Lab Units 02/21/21  0431 02/20/21  1033   WBC 10*3/mm3 9.73 7.78   HEMOGLOBIN g/dL 12.0* 13.5   HEMATOCRIT % 37.3* 40.5   PLATELETS 10*3/mm3 153 186     Results from last 7 days   Lab Units 02/21/21  0431 02/20/21  1033   SODIUM mmol/L 137 139   POTASSIUM mmol/L 4.4 4.7   CHLORIDE mmol/L 103 107   CO2 mmol/L 25.1 21.3*   BUN mg/dL 16 18   CREATININE mg/dL 1.23 1.32*   GLUCOSE mg/dL 130* 111*   CALCIUM mg/dL 8.4* 9.5     Results from last 7 days   Lab Units 02/21/21  8717  02/20/21  1033   INR  1.47* 2.68*   APTT seconds  --  38.7*     No results found for: CRYSTAL]  No results found for: CULTURE]  No results found for: URICACID]    No results found.    Assessment/Plan :  Discharge Plan Home on today. I have had a long discussion with the patients wife on the phone and the patients nurse Erna in the room. He understands that he needs three dose of postop antibiotic therapy. He understands that he needs additional surgery and if he lived local we would recommend a follow up in our office this week to reevaluate. They assure me they have an orthopedic surgeon who will take over his care. He understands the risk of travel and does not want to stay here in [place]. I have asked the nurse to contact the admitting doctor for consideration of a lovenox bridge as the patient is at increase risk of DVT/PE. He should not be discharged to home without verifying that he has completed his third postop dose of ancef, anticoagulation plan, and that his wife was able to get his pain prescription. Then from an orthopedic standpoint he may transfer to home for definitive treatment of his ankle fracture dislocation.  I have offered our assistance in completing treatment here and the patient has declined      Ksenia Florez, APRN      Date: 2/21/2021   Time: 07:53 EST

## 2021-02-22 ENCOUNTER — READMISSION MANAGEMENT (OUTPATIENT)
Dept: CALL CENTER | Facility: HOSPITAL | Age: 66
End: 2021-02-22

## 2021-02-22 NOTE — OUTREACH NOTE
Prep Survey      Responses   [facility] facility patient discharged from?  [facility]   Is LACE score < 7 ?  Yes   Emergency Room discharge w/ pulse ox?  No   Eligibility  Readm Mgmt   Discharge diagnosis  Open fracture dislocation of right ankle   Does the patient have one of the following disease processes/diagnoses(primary or secondary)?  General Surgery   Does the patient have Home health ordered?  No   Is there a DME ordered?  No   Prep survey completed?  Yes          Tamika Valdez RN

## 2021-02-23 NOTE — PROGRESS NOTES
Case Management Discharge Note      Final Note: Home with family/friend support. No d/c orders for DME/RH/HH noted.         Selected Continued Care - Discharged on 2/21/2021 Admission date: 2/20/2021 - Discharge disposition: Home or Self Care    Destination    No services have been selected for the patient.              Durable Medical Equipment    No services have been selected for the patient.              Dialysis/Infusion    No services have been selected for the patient.              Home Medical Care    No services have been selected for the patient.              Therapy    No services have been selected for the patient.              Community Resources    No services have been selected for the patient.                       Final Discharge Disposition Code: 01 - home or self-care

## 2021-06-17 NOTE — ED NOTES
Pt wearing mask. Myself had mask, and eye wear/PPE when present with pt. Hand hygiene performed before and after pt care.     Miriam Cabrales, PCT  02/20/21 1226     Area H Indication Text: Tumors in this location are included in Area H (eyelids, eyebrows, nose, lips, chin, ear, pre-auricular, post-auricular, temple, genitalia, hands, feet, ankles and areola).  Tissue conservation is critical in these anatomic locations.

## (undated) DEVICE — SUT PDS 3/0 SH 27IN DYED Z316H

## (undated) DEVICE — GLV SURG BIOGEL LTX PF 8

## (undated) DEVICE — GLV SURG PREMIERPRO ORTHO LTX PF SZ8 BRN

## (undated) DEVICE — DRSNG GZ CURAD XEROFORM NONADHS 5X9IN STRL

## (undated) DEVICE — Device

## (undated) DEVICE — BNDG ESMARK 4IN 12FT LF STRL BLU

## (undated) DEVICE — PAD,ABDOMINAL,8"X10",ST,LF: Brand: MEDLINE

## (undated) DEVICE — APPL CHLORAPREP HI/LITE 26ML ORNG

## (undated) DEVICE — 350MM BAR: Brand: JET-X

## (undated) DEVICE — PREMIUM WET SKIN PREP TRAY: Brand: MEDLINE INDUSTRIES, INC.

## (undated) DEVICE — SPNG LAP 18X18IN LF STRL PK/5

## (undated) DEVICE — TRAP FLD MINIVAC MEGADYNE 100ML

## (undated) DEVICE — BANDAGE,GAUZE,BULKEE II,4.5"X4.1YD,STRL: Brand: MEDLINE

## (undated) DEVICE — ST IRR CYSTO W/SPK 77IN LF

## (undated) DEVICE — SUT PDS 2 0 CT1 27IN CLR Z259H

## (undated) DEVICE — 3M™ IOBAN™ 2 ANTIMICROBIAL INCISE DRAPE 6650EZ: Brand: IOBAN™ 2

## (undated) DEVICE — JET-X FREEDOM CLAMP 10.5MM TO 4MM: Brand: JET-X

## (undated) DEVICE — PENCL E/S ULTRAVAC TELESCP NOSE HOLSTR 10FT

## (undated) DEVICE — 3M™ STERI-DRAPE™ U-DRAPE 1015: Brand: STERI-DRAPE™

## (undated) DEVICE — BNDG ELAS CO-FLEX SLF ADHR 6IN 5YD LF STRL

## (undated) DEVICE — DRP C/ARMOR

## (undated) DEVICE — SUT ETHLN 2/0 PS 18IN 585H

## (undated) DEVICE — DRAPE,U/ SHT,SPLIT,PLAS,STERIL: Brand: MEDLINE

## (undated) DEVICE — GLV SURG SIGNATURE ESSENTIAL PF LTX SZ8

## (undated) DEVICE — DRAPE,REIN 53X77,STERILE: Brand: MEDLINE

## (undated) DEVICE — 600 MM BAR: Brand: JET-X

## (undated) DEVICE — DRP C/ARM 41X74IN

## (undated) DEVICE — SUT ETHLN 3/0 PS1 18IN 1663H

## (undated) DEVICE — PK ORTHO MINOR 40

## (undated) DEVICE — BNDG ELAS CO-FLEX SLF ADHR 4IN5YD LF STRL

## (undated) DEVICE — STCKNT IMPERV 12IN STRL

## (undated) DEVICE — BNDG ELAS ELITE V/CLOSE 6IN 5YD LF STRL

## (undated) DEVICE — SUT MNCRYL 3/0 SH 27 IN Y416H

## (undated) DEVICE — 300 MM BAR: Brand: JET-X

## (undated) DEVICE — UNDERCAST PADDING: Brand: DEROYAL

## (undated) DEVICE — SOL ISO/ALC RUB 70PCT 4OZ